# Patient Record
Sex: MALE | Race: WHITE | NOT HISPANIC OR LATINO | Employment: OTHER | URBAN - NONMETROPOLITAN AREA
[De-identification: names, ages, dates, MRNs, and addresses within clinical notes are randomized per-mention and may not be internally consistent; named-entity substitution may affect disease eponyms.]

---

## 2017-01-04 ENCOUNTER — OFFICE VISIT (OUTPATIENT)
Dept: CARDIOLOGY | Facility: CLINIC | Age: 82
End: 2017-01-04

## 2017-01-04 DIAGNOSIS — I42.9 CARDIOMYOPATHY (HCC): Primary | ICD-10-CM

## 2017-01-04 DIAGNOSIS — I48.20 CHRONIC ATRIAL FIBRILLATION (HCC): ICD-10-CM

## 2017-01-04 PROCEDURE — 93282 PRGRMG EVAL IMPLANTABLE DFB: CPT | Performed by: INTERNAL MEDICINE

## 2017-01-04 NOTE — MR AVS SNAPSHOT
Kyung Graves   1/4/2017 11:15 AM   Appointment    Dept Phone:  [phone]   Encounter #:  [id number]    Provider:  [facility]   Department:  [facility] CARDIOLOGY                Your Full Care Plan              Your Updated Medication List          This list is accurate as of: 1/4/17 11:48 AM.  Always use your most recent med list.                allopurinol 300 MG tablet   Commonly known as:  ZYLOPRIM       carvedilol 6.25 MG tablet   Commonly known as:  COREG       cholecalciferol 1000 UNITS tablet   Commonly known as:  VITAMIN D3       dicyclomine 20 MG tablet   Commonly known as:  BENTYL   TAKE ONE TABLET BY MOUTH TWICE DAILY       levothyroxine 100 MCG tablet   Commonly known as:  SYNTHROID, LEVOTHROID   TAKE ONE TABLET BY MOUTH ONCE DAILY       omeprazole 20 MG capsule   Commonly known as:  priLOSEC       warfarin 5 MG tablet   Commonly known as:  COUMADIN               Instructions     None    Patient Instructions History      Upcoming Appointments     Visit Type Date Time Department    [facility] 1/4/2017 11:15 AM [facility]    FOLLOW UP 2/23/2017  9:00 AM [facility]      MyChart Signup     Our records indicate that your [facility] [employer] account has been deactivated. If you would like to reactivate your account, please email [email or web address] or call [phone] to talk to our [employer] staff.             Other Info from Your Visit           Your Appointments     Feb 23, 2017  9:00 AM EST   Follow Up with PAULO Fry   [facility] CARDIOLOGY (--)    [place] [id number] [phone]           Arrive 15 minutes prior to appointment.              Allergies     No Known Allergies      Vital Signs     Smoking Status                   Former Smoker

## 2017-02-23 ENCOUNTER — OFFICE VISIT (OUTPATIENT)
Dept: CARDIOLOGY | Facility: CLINIC | Age: 82
End: 2017-02-23

## 2017-02-23 VITALS
DIASTOLIC BLOOD PRESSURE: 82 MMHG | HEIGHT: 74 IN | SYSTOLIC BLOOD PRESSURE: 120 MMHG | WEIGHT: 274 LBS | BODY MASS INDEX: 35.16 KG/M2

## 2017-02-23 DIAGNOSIS — Z95.810 S/P ICD (INTERNAL CARDIAC DEFIBRILLATOR) PROCEDURE: ICD-10-CM

## 2017-02-23 DIAGNOSIS — I10 ESSENTIAL HYPERTENSION: ICD-10-CM

## 2017-02-23 DIAGNOSIS — E03.8 OTHER SPECIFIED HYPOTHYROIDISM: ICD-10-CM

## 2017-02-23 DIAGNOSIS — I48.0 PAF (PAROXYSMAL ATRIAL FIBRILLATION) (HCC): ICD-10-CM

## 2017-02-23 DIAGNOSIS — I42.9 CARDIOMYOPATHY (HCC): Primary | ICD-10-CM

## 2017-02-23 PROBLEM — E03.9 HYPOTHYROID: Status: ACTIVE | Noted: 2017-02-23

## 2017-02-23 PROCEDURE — 93000 ELECTROCARDIOGRAM COMPLETE: CPT | Performed by: NURSE PRACTITIONER

## 2017-02-23 PROCEDURE — 99213 OFFICE O/P EST LOW 20 MIN: CPT | Performed by: NURSE PRACTITIONER

## 2017-02-23 RX ORDER — WARFARIN SODIUM 7.5 MG/1
7.5 TABLET ORAL
COMMUNITY
End: 2021-05-19 | Stop reason: ALTCHOICE

## 2017-02-23 RX ORDER — CARVEDILOL 3.12 MG/1
3.12 TABLET ORAL 2 TIMES DAILY WITH MEALS
COMMUNITY
End: 2023-01-04 | Stop reason: SDUPTHER

## 2017-02-23 NOTE — PROGRESS NOTES
Chief Complaint   Patient presents with   • Follow-up     Denies chest pain or palpitations. PCP refills meds. Labs per PCP about 3 weeks ago.    • Device Check     [facility] ICD check on 01/04/17. States that he was supposed to have been made an appointment for generator changeout but never heard anything.    • Shortness of Breath     About the same as before.        Cardiac Complaints  dyspnea      Subjective   Kyung Graves is a 84 y.o. male with a history of non-ischemic cardiomyopathy diagnosed in the past for which he had an ICD placed. In 2015, he underwent a cardiac catheterization that showed normal coronaries. [employer] ICD interrogation in January showed decreased battery life and about 3% ventricular pacing.Today he returns for a follow up appointment and denies chest pain and palpitations.  He admits to some shortness of breath which is the same as prior.  PCP refills all meds and manages blood work.  He is no longer following with nephrology and reports his blood pressure has been well controlled.    Cardiac History  Past Surgical History   Procedure Laterality Date   • Prostate surgery       Laser surgery 15 Years ago   • Cath lab procedure  07/21/2005     Cath-() Normal Coronaries. Normal EF   • Cardiovascular stress test  01/14/2008     P.Myoview-() EF 34% Lateral Infarct   • Pacemaker implantation  05/04/2010     Implantable defibrillator /ICD-() [employer]   • Echo - converted  06/14/2011     Echo-() EF35%. Inferior WMA.   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 RVSP-42mmHg.   • Cardiovascular stress test  12/20/2013     L.Myoview-mild ischemia, Imdur, cath if symptoms persist   • Cath lab procedure  02/13/2015     Cath-normal coronaries       Current Outpatient Prescriptions   Medication Sig Dispense Refill    • allopurinol (ZYLOPRIM) 300 MG tablet Take 300 mg by mouth daily.     • carvedilol (COREG) 3.125 MG tablet Take 3.125 mg by mouth 2 (Two) Times a Day With Meals.     • cholecalciferol (VITAMIN D3) 1000 UNITS tablet Take 1,000 Units by mouth daily.     • dicyclomine (BENTYL) 20 MG tablet TAKE ONE TABLET BY MOUTH TWICE DAILY (Patient taking differently: TAKE ONE TABLET BY MOUTH TWICE DAILY prn) 180 tablet 0   • levothyroxine (SYNTHROID, LEVOTHROID) 100 MCG tablet TAKE ONE TABLET BY MOUTH ONCE DAILY 90 tablet 0   • warfarin (COUMADIN) 5 MG tablet Take 5 mg by mouth daily. PCP moniters     • warfarin (COUMADIN) 7.5 MG tablet Take 7.5 mg by mouth. As directed per PCP       No current facility-administered medications for this visit.        Review of patient's allergies indicates no known allergies.    Past Medical History   Diagnosis Date   • A-fib      Hx   • Aortic valve regurgitation      Hx   • Diabetes mellitus    • Ejection fraction < 50%      Low   • History of prostate surgery      Laser surgery on prostate > 15yrs ago   • Hypertension    • ICD (implantable cardioverter-defibrillator) in place      ICD- placement ([facility]- 5/4/10)   • Sleep apnea      CPAP       Social History     Social History   • Marital status:      Spouse name: N/A   • Number of children: N/A   • Years of education: N/A     Occupational History   • Not on file.     Social History Main Topics   • Smoking status: Former Smoker   • Smokeless tobacco: Never Used   • Alcohol use No   • Drug use: No   • Sexual activity: Not on file     Other Topics Concern   • Not on file     Social History Narrative       Family History   Problem Relation Age of Onset   • No Known Problems Mother    • No Known Problems Father    • Cancer Other        Review of Systems   Constitutional: Negative.    HENT: Negative.    Respiratory: Positive for shortness of breath.    Cardiovascular: Negative.    Endocrine: Negative.    Musculoskeletal: Negative.   "  Neurological: Negative.    Psychiatric/Behavioral: Negative.        DiabetesNo  Thyroidnormal    Objective     Visit Vitals   • /82   • Ht 74\" (188 cm)   • Wt 274 lb (124 kg)   • BMI 35.18 kg/m2       Physical Exam   Constitutional: He is oriented to person, place, and time. He appears well-developed and well-nourished.   HENT:   Head: Normocephalic and atraumatic.   Eyes: EOM are normal. Pupils are equal, round, and reactive to light.   Neck: Normal range of motion.   Cardiovascular: Normal rate and regular rhythm.    Murmur heard.  Pulmonary/Chest: Effort normal and breath sounds normal.   Abdominal: Soft.   Musculoskeletal: Normal range of motion.   Neurological: He is alert and oriented to person, place, and time.   Skin: Skin is warm and dry.   Psychiatric: He has a normal mood and affect.         ECG 12 Lead  Date/Time: 2/23/2017 9:10 AM  Performed by: DANIEL BLANDON  Authorized by: DANIEL BLANDON   Rhythm: sinus rhythm  BPM: 71  Clinical impression: abnormal ECG  Comments: AV sensing          Assessment/Plan     HR and BP are stable today.  EKG done today for ICD and history of PAF shows NSR with first degree block with normal QT.  No changes will be made to medication.  Labs and refills he reports with you.  Could we get next copy?  No new cardiac testing at this time as no new concerns are voiced.  [employer] pacer check advised for ASAP as last pacer check in January showed about 6 months of battery life remaining.  6 month follow up advised or sooner if needed.      Problems Addressed this Visit        Cardiovascular and Mediastinum    Cardiomyopathy - Primary    Relevant Medications    carvedilol (COREG) 3.125 MG tablet    Other Relevant Orders    ECG 12 Lead (Completed)    HTN (hypertension)    Relevant Medications    carvedilol (COREG) 3.125 MG tablet    PAF (paroxysmal atrial fibrillation)    Relevant Medications    carvedilol (COREG) 3.125 MG tablet    Other Relevant Orders    ECG 12 Lead " (Completed)       Endocrine    Hypothyroid    Relevant Medications    carvedilol (COREG) 3.125 MG tablet       Other    S/P ICD (internal cardiac defibrillator) procedure              Electronically signed by PAULO Davis February 23, 2017 9:33 AM

## 2017-03-01 ENCOUNTER — OFFICE VISIT (OUTPATIENT)
Dept: CARDIOLOGY | Facility: CLINIC | Age: 82
End: 2017-03-01

## 2017-03-01 DIAGNOSIS — I48.0 PAF (PAROXYSMAL ATRIAL FIBRILLATION) (HCC): ICD-10-CM

## 2017-03-01 DIAGNOSIS — I42.9 CARDIOMYOPATHY (HCC): Primary | ICD-10-CM

## 2017-03-01 PROCEDURE — 93289 INTERROG DEVICE EVAL HEART: CPT | Performed by: INTERNAL MEDICINE

## 2017-06-21 ENCOUNTER — OFFICE VISIT (OUTPATIENT)
Dept: CARDIOLOGY | Facility: CLINIC | Age: 82
End: 2017-06-21

## 2017-06-21 DIAGNOSIS — I42.9 CARDIOMYOPATHY (HCC): Primary | ICD-10-CM

## 2017-06-21 DIAGNOSIS — I48.0 PAF (PAROXYSMAL ATRIAL FIBRILLATION) (HCC): ICD-10-CM

## 2017-06-21 PROCEDURE — 93289 INTERROG DEVICE EVAL HEART: CPT | Performed by: INTERNAL MEDICINE

## 2017-08-28 ENCOUNTER — OFFICE VISIT (OUTPATIENT)
Dept: CARDIOLOGY | Facility: CLINIC | Age: 82
End: 2017-08-28

## 2017-08-28 VITALS
WEIGHT: 268 LBS | HEIGHT: 74 IN | SYSTOLIC BLOOD PRESSURE: 108 MMHG | BODY MASS INDEX: 34.39 KG/M2 | DIASTOLIC BLOOD PRESSURE: 70 MMHG | HEART RATE: 71 BPM

## 2017-08-28 DIAGNOSIS — I42.9 CARDIOMYOPATHY (HCC): ICD-10-CM

## 2017-08-28 DIAGNOSIS — I44.0 FIRST DEGREE AV BLOCK: ICD-10-CM

## 2017-08-28 DIAGNOSIS — Z95.810 S/P ICD (INTERNAL CARDIAC DEFIBRILLATOR) PROCEDURE: ICD-10-CM

## 2017-08-28 DIAGNOSIS — I10 ESSENTIAL HYPERTENSION: ICD-10-CM

## 2017-08-28 DIAGNOSIS — I48.0 PAF (PAROXYSMAL ATRIAL FIBRILLATION) (HCC): Primary | ICD-10-CM

## 2017-08-28 DIAGNOSIS — Z79.01 CURRENT USE OF LONG TERM ANTICOAGULATION: ICD-10-CM

## 2017-08-28 PROCEDURE — 99214 OFFICE O/P EST MOD 30 MIN: CPT | Performed by: NURSE PRACTITIONER

## 2017-08-28 PROCEDURE — 93000 ELECTROCARDIOGRAM COMPLETE: CPT | Performed by: NURSE PRACTITIONER

## 2017-08-28 NOTE — PROGRESS NOTES
Chief Complaint   Patient presents with   • Follow-up     PCP refills meds. Labs per PCP about 2-3 months ago.    • Device check     [facility] ICD last checked on 06/21/17.    • Chest Pain     Has had a few sharp chest pains.    • Palpitations     Had an episode last week in which his heart was fluttering. Says he sat down and rested and it resolved.        Subjective       Kyung Graves is a 85 y.o. male  with a history of non-ischemic cardiomyopathy diagnosed in the past for which he had an ICD placed. In 2015, he underwent a cardiac catheterization that showed normal coronaries. [employer] ICD interrogation in June showed 6.8 months battery life and normal function.   Today he comes to the office for a follow up appointment. He admits to an episode of feeling his heart race for about 10 minutes that resolved with rest. He does not feel he has had any significant chest pain, rarely feels a sharp pain across his chest. No shortness of breath noted.     HPI         Cardiac History:    Past Surgical History:   Procedure Laterality Date   • CARDIOVASCULAR STRESS TEST  01/14/2008    P.[place]-() EF 34% Lateral Infarct   • CARDIOVASCULAR STRESS TEST  12/20/2013    L.Myoview-mild ischemia, Imdur, cath if symptoms persist   • CAROTID ARTERY - SUBCLAVIAN ARTERY BYPASS GRAFT  06/14/2011    Carotid US-Normal   • CATH LAB PROCEDURE  07/21/2005    Cath-() Normal Coronaries. Normal EF   • CATH LAB PROCEDURE  02/13/2015    Cath-normal coronaries   • ECHO - CONVERTED  06/14/2011    Echo-() EF35%. Inferior WMA.   • ECHO - CONVERTED  02/01/2012    Echo-EF 50-55%, aortic root dilation   • ECHO - CONVERTED  12/03/2013    Echo-EF 50%. RVSP-42mmHg.   • PACEMAKER IMPLANTATION  05/04/2010    Implantable defibrillator /ICD-() [employer]   • PROSTATE SURGERY      Laser surgery 15 Years ago       Current Outpatient Prescriptions   Medication Sig Dispense Refill   • allopurinol (ZYLOPRIM) 300 MG tablet Take 300 mg by  mouth daily.     • carvedilol (COREG) 3.125 MG tablet Take 3.125 mg by mouth 2 (Two) Times a Day With Meals.     • levothyroxine (SYNTHROID, LEVOTHROID) 100 MCG tablet TAKE ONE TABLET BY MOUTH ONCE DAILY 90 tablet 0   • warfarin (COUMADIN) 5 MG tablet Take 5 mg by mouth daily. PCP moniters     • warfarin (COUMADIN) 7.5 MG tablet Take 7.5 mg by mouth. As directed per PCP       No current facility-administered medications for this visit.        Review of patient's allergies indicates no known allergies.    Past Medical History:   Diagnosis Date   • A-fib     Hx   • Aortic valve regurgitation     Hx   • Diabetes mellitus    • Ejection fraction < 50%     Low   • History of prostate surgery     Laser surgery on prostate > 15yrs ago   • Hypertension    • ICD (implantable cardioverter-defibrillator) in place     ICD- placement ([facility]- 5/4/10)   • Sleep apnea     CPAP       Social History     Social History   • Marital status:      Spouse name: N/A   • Number of children: N/A   • Years of education: N/A     Occupational History   • Not on file.     Social History Main Topics   • Smoking status: Former Smoker   • Smokeless tobacco: Never Used   • Alcohol use No   • Drug use: No   • Sexual activity: Not on file     Other Topics Concern   • Not on file     Social History Narrative       Family History   Problem Relation Age of Onset   • No Known Problems Mother    • No Known Problems Father    • Cancer Other        Review of Systems   Constitution: Negative for decreased appetite and malaise/fatigue.   HENT: Negative for congestion, nosebleeds and sore throat.    Eyes: Negative for blurred vision.   Respiratory: Negative for shortness of breath, snoring, sputum production and wheezing. Cough: occasional.    Endocrine: Positive for cold intolerance. Negative for heat intolerance, polydipsia, polyphagia and polyuria.   Hematologic/Lymphatic: Negative for adenopathy. Does not bruise/bleed easily.   Skin: Negative for  "itching, nail changes and skin cancer.   Musculoskeletal: Negative for arthritis, falls (ambulates with use of cane) and myalgias.   Gastrointestinal: Negative for abdominal pain, dysphagia, heartburn, melena and nausea.   Genitourinary: Negative for bladder incontinence, frequency and hematuria.   Neurological: Negative for dizziness, light-headedness, seizures and vertigo.   Psychiatric/Behavioral: Negative for altered mental status.   Allergic/Immunologic: Negative for environmental allergies and hives.        Diabetes- Yes  Thyroid-normal    Objective     /70  Pulse 71  Ht 74\" (188 cm)  Wt 268 lb (122 kg)  BMI 34.41 kg/m2    Physical Exam   Constitutional: He is oriented to person, place, and time. He appears well-nourished.   HENT:   Head: Normocephalic.   Eyes: Conjunctivae are normal. Pupils are equal, round, and reactive to light.   Neck: Normal range of motion. Neck supple. No JVD present.   Cardiovascular: Normal rate, regular rhythm, S1 normal and S2 normal.    Murmur heard.   Systolic murmur is present with a grade of 2/6   Pulses:       Radial pulses are 2+ on the right side, and 2+ on the left side.   Pulmonary/Chest: Effort normal and breath sounds normal. He has no wheezes. He has no rales.   Abdominal: Soft. Bowel sounds are normal. He exhibits no distension. There is no tenderness.   Musculoskeletal: Normal range of motion. He exhibits no edema.   Neurological: He is alert and oriented to person, place, and time.   Skin: Skin is warm and dry. No rash noted. No pallor.   Psychiatric: He has a normal mood and affect. His behavior is normal.            ECG 12 Lead  Date/Time: 8/28/2017 9:03 AM  Performed by: DIONNA BOYCE  Authorized by: DIONNA BOYCE   Comparison: compared with previous ECG from 2/23/2017  Comparison to previous ECG: Sinus with first degree AV block  Rhythm: sinus rhythm  Rate: normal  BPM: 71  Conduction: 1st degree                  Assessment/Plan      Kyung was seen " today for follow-up, device check, chest pain and palpitations.    Diagnoses and all orders for this visit:    PAF (paroxysmal atrial fibrillation)  -     ECG 12 Lead    Cardiomyopathy    Essential hypertension    Current use of long term anticoagulation    S/P ICD (internal cardiac defibrillator) procedure    First degree AV block  -     ECG 12 Lead        Thank you for sending a copy of March lab report which showed normal LFT and renal function. Glucose managed by you. I encouraged him on good diabetic diet.   His vital signs today are stable. We reviewed his most recent ICD check which shows diminishing battery life. An interrogation scheduled for September. Due to ICD and PAF an EKG done today that shows sinus with first degree AV block similar to last EKG. No changes to cardiac medication or cardiac testing recommended at this time. Should symptoms or problems develop he understands to call.            Electronically signed by PAULO Carroll,  August 28, 2017 9:20 AM

## 2017-09-06 ENCOUNTER — OFFICE VISIT (OUTPATIENT)
Dept: CARDIOLOGY | Facility: CLINIC | Age: 82
End: 2017-09-06

## 2017-09-06 DIAGNOSIS — I49.5 SSS (SICK SINUS SYNDROME) (HCC): ICD-10-CM

## 2017-09-06 DIAGNOSIS — I48.0 PAF (PAROXYSMAL ATRIAL FIBRILLATION) (HCC): Primary | ICD-10-CM

## 2017-09-12 PROBLEM — I49.5 SSS (SICK SINUS SYNDROME): Status: ACTIVE | Noted: 2017-09-12

## 2018-03-01 ENCOUNTER — OFFICE VISIT (OUTPATIENT)
Dept: CARDIOLOGY | Facility: CLINIC | Age: 83
End: 2018-03-01

## 2018-03-01 VITALS
SYSTOLIC BLOOD PRESSURE: 120 MMHG | HEART RATE: 76 BPM | HEIGHT: 74 IN | DIASTOLIC BLOOD PRESSURE: 74 MMHG | WEIGHT: 273 LBS | BODY MASS INDEX: 35.04 KG/M2

## 2018-03-01 DIAGNOSIS — Z95.810 S/P ICD (INTERNAL CARDIAC DEFIBRILLATOR) PROCEDURE: ICD-10-CM

## 2018-03-01 DIAGNOSIS — I10 ESSENTIAL HYPERTENSION: ICD-10-CM

## 2018-03-01 DIAGNOSIS — I49.5 SSS (SICK SINUS SYNDROME) (HCC): ICD-10-CM

## 2018-03-01 DIAGNOSIS — I48.0 PAF (PAROXYSMAL ATRIAL FIBRILLATION) (HCC): ICD-10-CM

## 2018-03-01 DIAGNOSIS — I42.8 OTHER CARDIOMYOPATHY (HCC): Primary | ICD-10-CM

## 2018-03-01 PROCEDURE — 93000 ELECTROCARDIOGRAM COMPLETE: CPT | Performed by: NURSE PRACTITIONER

## 2018-03-01 PROCEDURE — 99213 OFFICE O/P EST LOW 20 MIN: CPT | Performed by: NURSE PRACTITIONER

## 2018-03-01 NOTE — PROGRESS NOTES
"Chief Complaint   Patient presents with   • Follow-up     For cardiac management. He states about a month ago he had some teeth extracted, had stopped Coumadin for 3 days prior to procedure. PCP manages INR and labs. PCP refills medication.   • Device check     Last [facility] ICD checked 9/6/17.   • Chest Pain     He states having some sharp pains to left chest in the evening.   • Dizziness     He states has always had, nothing new for him.       Subjective       Kyung Graves is a 85 y.o. male with a history of PAF and non-ischemic cardiomyopathy with EF 35% diagnosed in the past for which he had an ICD placed.  He is anticoagulated with warfarin managed by PCP.  Cardiac cath repeated in 2015 showed normal coronaries.  Last [facility] ICD interrogation on 9/6/17 showed his battery life is nearing BUSTER with 6.8 months battery life remaining, 3% RVP and no atrial pacing.  He came in today for his follow up visit overall feeling well.  He does report an occasional sharp chest pain for the last one month.  The pain \"shoots through\" the left side of his chest and lasts a few seconds.  It is not exertional or predictable but usually occurs in the evening after meal.  He does feel short of breath at times, worse with exertion.  Labs managed by primary care with most recent 3/2017 showing normal CBC, glucose 164, A1C 7.4%, BUN/Cr 16/1.2, cholesterol 100, Tri 66, HDL 75, LDL 11.  He is not taking any statin.  He remains active and performs ADLs independently.  No refills needed.      HPI         Cardiac History:    Past Surgical History:   Procedure Laterality Date   • CARDIAC CATHETERIZATION  07/21/2005    Cath-() Normal Coronaries. Normal EF   • CARDIAC CATHETERIZATION  02/13/2015    Cath-normal coronaries   • CARDIOVASCULAR STRESS TEST  01/14/2008    [place]-() EF 34% Lateral Infarct   • CARDIOVASCULAR STRESS TEST  12/20/2013    L.Myoview-mild ischemia, Imdur, cath if symptoms persist   • ECHO - CONVERTED  " 06/14/2011    Echo-() EF35%. Inferior WMA.   • ECHO - CONVERTED  02/01/2012    Echo-EF 50-55%, aortic root dilation   • ECHO - CONVERTED  12/03/2013    Echo-EF 50%. RVSP-42mmHg.   • PACEMAKER IMPLANTATION  05/04/2010    Implantable defibrillator /ICD-() [employer]   • US CAROTID UNILATERAL  06/14/2011    Carotid US-Normal       Current Outpatient Prescriptions   Medication Sig Dispense Refill   • allopurinol (ZYLOPRIM) 300 MG tablet Take 300 mg by mouth daily.     • carvedilol (COREG) 3.125 MG tablet Take 3.125 mg by mouth 2 (Two) Times a Day With Meals.     • levothyroxine (SYNTHROID, LEVOTHROID) 100 MCG tablet TAKE ONE TABLET BY MOUTH ONCE DAILY 90 tablet 0   • warfarin (COUMADIN) 5 MG tablet Take 5 mg by mouth daily. PCP moniters     • warfarin (COUMADIN) 7.5 MG tablet Take 7.5 mg by mouth. As directed per PCP       No current facility-administered medications for this visit.        Review of patient's allergies indicates no known allergies.    Past Medical History:   Diagnosis Date   • A-fib     Hx   • Aortic valve regurgitation     Hx   • Diabetes mellitus    • Ejection fraction < 50%     Low   • History of prostate surgery     Laser surgery on prostate > 15yrs ago   • Hypertension    • ICD (implantable cardioverter-defibrillator) in place     ICD- placement ([facility]- 5/4/10)   • Sleep apnea     CPAP       Social History     Social History   • Marital status:      Spouse name: N/A   • Number of children: N/A   • Years of education: N/A     Occupational History   • Not on file.     Social History Main Topics   • Smoking status: Former Smoker   • Smokeless tobacco: Never Used   • Alcohol use No   • Drug use: No   • Sexual activity: Not on file     Other Topics Concern   • Not on file     Social History Narrative       Family History   Problem Relation Age of Onset   • No Known Problems Mother    • No Known Problems Father    • Cancer Other        Review of Systems   Constitution: Negative for  "weakness and malaise/fatigue.   HENT: Negative.    Eyes: Negative.    Cardiovascular: Positive for chest pain.   Respiratory: Positive for shortness of breath. Negative for cough.    Endocrine: Negative.    Hematologic/Lymphatic: Negative.    Skin: Negative.    Musculoskeletal: Negative for back pain and myalgias.   Gastrointestinal: Positive for heartburn. Negative for abdominal pain and melena.   Genitourinary: Negative for dysuria and hematuria.   Neurological: Negative for dizziness.   Psychiatric/Behavioral: Negative for altered mental status.   Allergic/Immunologic: Negative.         Diabetes- Yes, A1C 7.4%  Thyroid-normal    Objective     /74 (BP Location: Left arm)  Pulse 76  Ht 188 cm (74.02\")  Wt 124 kg (273 lb)  BMI 35.04 kg/m2    Physical Exam   Constitutional: He is oriented to person, place, and time. He appears well-developed and well-nourished.   HENT:   Head: Normocephalic.   Eyes: Pupils are equal, round, and reactive to light.   Neck: Normal range of motion.   Cardiovascular: Normal rate, regular rhythm and intact distal pulses.    Murmur heard.  Pulmonary/Chest: Effort normal and breath sounds normal. No respiratory distress. He has no wheezes. He has no rales.   Abdominal: Soft. Bowel sounds are normal.   Musculoskeletal: Normal range of motion. He exhibits edema (trace).   Neurological: He is alert and oriented to person, place, and time.   Skin: Skin is warm and dry. No pallor.   Psychiatric: He has a normal mood and affect.   Nursing note and vitals reviewed.       ECG 12 Lead  Date/Time: 3/2/2018 11:36 AM  Performed by: LOTUS FOX  Authorized by: LOTUS FOX   Comparison: compared with previous ECG from 8/28/2017  Similar to previous ECG  Comparison to previous ECG: Sinus with 1st degree block   Rhythm: sinus rhythm  Rate: normal  BPM: 76  Clinical impression: non-specific ECG  Comments: QTc 436 ms                  Assessment/Plan    Heart rate and blood pressure stable.  EKG " done today showed sinus with arrhythmia, QTc 436 ms.  He will be scheduled for [facility] pacer check to evaluate battery status as he is nearing BUSTER.  When he reaches BUSTER, he will be referred to Dr. Willson for generator change.  This was explained to him.  Labs have been managed by primary care.  Cholesterol is extremely low with LDL 11, not on any cholesterol lowering medication.  We discussed his chest pain which appears to be more GI related.  We discussed repeating cardiac work up which he has declined at this time.  He did agree to undergo echocardiogram to evaluate LV function, valvular structures, rule out any pericardial effusion, and look for wall motion abnormality.  This will be coordinated with his pacer check.  He will follow with you for labs.  No refills needed today.  Recommendations to follow ICD interrogation and echo.  We will see him back in six months or sooner if needed.       Kyung was seen today for follow-up, device check, chest pain and dizziness.    Diagnoses and all orders for this visit:    Other cardiomyopathy  -     Adult Transthoracic Echo Complete W/ Cont if Necessary Per Protocol; Future    Essential hypertension  -     Adult Transthoracic Echo Complete W/ Cont if Necessary Per Protocol; Future    PAF (paroxysmal atrial fibrillation)  -     Adult Transthoracic Echo Complete W/ Cont if Necessary Per Protocol; Future    SSS (sick sinus syndrome)  -     Adult Transthoracic Echo Complete W/ Cont if Necessary Per Protocol; Future    S/P ICD (internal cardiac defibrillator) procedure  -     Adult Transthoracic Echo Complete W/ Cont if Necessary Per Protocol; Future    Other orders  -     ECG 12 Lead                    Electronically signed by PAULO Hansen,  March 2, 2018 12:05 PM

## 2018-03-21 ENCOUNTER — OUTSIDE FACILITY SERVICE (OUTPATIENT)
Dept: CARDIOLOGY | Facility: CLINIC | Age: 83
End: 2018-03-21

## 2018-03-21 ENCOUNTER — HOSPITAL ENCOUNTER (OUTPATIENT)
Dept: CARDIOLOGY | Facility: HOSPITAL | Age: 83
Discharge: HOME | End: 2018-03-21
Admitting: NURSE PRACTITIONER

## 2018-03-21 ENCOUNTER — OFFICE VISIT (OUTPATIENT)
Dept: CARDIOLOGY | Facility: CLINIC | Age: 83
End: 2018-03-21

## 2018-03-21 DIAGNOSIS — I10 ESSENTIAL HYPERTENSION: ICD-10-CM

## 2018-03-21 DIAGNOSIS — I48.0 PAF (PAROXYSMAL ATRIAL FIBRILLATION) (HCC): ICD-10-CM

## 2018-03-21 DIAGNOSIS — Z95.810 S/P ICD (INTERNAL CARDIAC DEFIBRILLATOR) PROCEDURE: ICD-10-CM

## 2018-03-21 DIAGNOSIS — I49.5 SSS (SICK SINUS SYNDROME) (HCC): ICD-10-CM

## 2018-03-21 DIAGNOSIS — I48.0 PAF (PAROXYSMAL ATRIAL FIBRILLATION) (HCC): Primary | ICD-10-CM

## 2018-03-21 DIAGNOSIS — I42.8 OTHER CARDIOMYOPATHY (HCC): ICD-10-CM

## 2018-03-21 LAB
MAXIMAL PREDICTED HEART RATE: 135 BPM
STRESS TARGET HR: 115 BPM

## 2018-03-21 PROCEDURE — 93289 INTERROG DEVICE EVAL HEART: CPT | Performed by: INTERNAL MEDICINE

## 2018-03-21 PROCEDURE — 93306 TTE W/DOPPLER COMPLETE: CPT | Performed by: INTERNAL MEDICINE

## 2018-03-21 PROCEDURE — 93306 TTE W/DOPPLER COMPLETE: CPT

## 2018-06-06 ENCOUNTER — OFFICE VISIT (OUTPATIENT)
Dept: CARDIOLOGY | Facility: CLINIC | Age: 83
End: 2018-06-06

## 2018-06-06 DIAGNOSIS — I48.0 PAF (PAROXYSMAL ATRIAL FIBRILLATION) (HCC): Primary | ICD-10-CM

## 2018-06-06 DIAGNOSIS — I49.5 SSS (SICK SINUS SYNDROME) (HCC): ICD-10-CM

## 2018-06-11 ENCOUNTER — TELEPHONE (OUTPATIENT)
Dept: CARDIOLOGY | Facility: CLINIC | Age: 83
End: 2018-06-11

## 2018-06-11 DIAGNOSIS — I49.5 SSS (SICK SINUS SYNDROME) (HCC): Primary | ICD-10-CM

## 2018-06-11 NOTE — TELEPHONE ENCOUNTER
Spoke with daughter regarding referral to Dr Willson for [employer] ICD generator change.  Wanted it after July 4th, scheduled for July 10th at 2:30. Daughter aware.     Please start referral.

## 2018-09-04 ENCOUNTER — OFFICE VISIT (OUTPATIENT)
Dept: CARDIOLOGY | Facility: CLINIC | Age: 83
End: 2018-09-04

## 2018-09-04 VITALS
WEIGHT: 263 LBS | HEART RATE: 88 BPM | SYSTOLIC BLOOD PRESSURE: 118 MMHG | DIASTOLIC BLOOD PRESSURE: 82 MMHG | HEIGHT: 74 IN | BODY MASS INDEX: 33.75 KG/M2

## 2018-09-04 DIAGNOSIS — Z95.810 S/P ICD (INTERNAL CARDIAC DEFIBRILLATOR) PROCEDURE: ICD-10-CM

## 2018-09-04 DIAGNOSIS — I10 ESSENTIAL HYPERTENSION: ICD-10-CM

## 2018-09-04 DIAGNOSIS — I48.0 PAF (PAROXYSMAL ATRIAL FIBRILLATION) (HCC): ICD-10-CM

## 2018-09-04 DIAGNOSIS — I49.5 SSS (SICK SINUS SYNDROME) (HCC): ICD-10-CM

## 2018-09-04 DIAGNOSIS — I42.8 OTHER CARDIOMYOPATHY (HCC): Primary | ICD-10-CM

## 2018-09-04 DIAGNOSIS — E03.8 OTHER SPECIFIED HYPOTHYROIDISM: ICD-10-CM

## 2018-09-04 PROCEDURE — 93000 ELECTROCARDIOGRAM COMPLETE: CPT | Performed by: NURSE PRACTITIONER

## 2018-09-04 PROCEDURE — 99213 OFFICE O/P EST LOW 20 MIN: CPT | Performed by: NURSE PRACTITIONER

## 2018-09-04 NOTE — PROGRESS NOTES
Chief Complaint   Patient presents with   • Follow-up     for cardiac management   • Med Refill     PCP writes refills and manages labs   • [employer] [facility] ICD     last checked 6/6/18, to see Dr Willson Sept 18 to schedule battery change   • Aspirin     pt does not take a daily aspirin, currently on Coumadin       Subjective       Kyung Graves is an 86 y.o. male  with a history of PAF and non-ischemic cardiomyopathy with EF 35% diagnosed in the past for which he had an ICD placed. He is anticoagulated with warfarin managed by PCP.  Cardiac cath repeated in 2015 showed normal coronaries. Last SJM ICD interrogation on 6/6/18 showed 2.9 months remaining, and he was referred to Dr. Willson for generator change. Echocardiogram on 3/21/18 showed questionable bicuspid AV, mod AI, AO root 4.9 cm.  Labs managed by primary care with most recent 3/2017 showing normal CBC, glucose 164, A1C 7.4%, BUN/Cr 16/1.2, cholesterol 100, Tri 66, HDL 75, LDL 11.  He is not taking any statin.  He came in today for follow up.  He remains asymptomatic. Denies chest pain, shortness of breath, or dizziness. He performs ADLs independently. PT/INR per Dr. Mathias has been stable. He is scheduled for generator change with Dr. Willson on 9/18/18. No refills needed.      HPI         Cardiac History:    Past Surgical History:   Procedure Laterality Date   • CARDIAC CATHETERIZATION  07/21/2005    Cath-() Normal Coronaries. Normal EF   • CARDIAC CATHETERIZATION  02/13/2015    Cath-normal coronaries   • CARDIOVASCULAR STRESS TEST  01/14/2008    Stacey-() EF 34% Lateral Infarct   • CARDIOVASCULAR STRESS TEST  12/20/2013    L.Myoview-mild ischemia, Imdur, cath if symptoms persist   • ECHO - CONVERTED  06/14/2011    Echo-() EF35%. Inferior WMA.   • ECHO - CONVERTED  02/01/2012    Echo-EF 50-55%, aortic root dilation   • ECHO - CONVERTED  12/03/2013    Echo-EF 50%. RVSP-42mmHg.   • ECHO - CONVERTED  03/21/2018    EF 55%. ?Bicuspia  AV. Mod AI. AO Root- 4.9   • PACEMAKER IMPLANTATION  05/04/2010    Implantable defibrillator /ICD-() [employer]   • US CAROTID UNILATERAL  06/14/2011    Carotid US-Normal       Current Outpatient Prescriptions   Medication Sig Dispense Refill   • allopurinol (ZYLOPRIM) 300 MG tablet Take 300 mg by mouth daily.     • carvedilol (COREG) 3.125 MG tablet Take 3.125 mg by mouth 2 (Two) Times a Day With Meals.     • levothyroxine (SYNTHROID, LEVOTHROID) 100 MCG tablet TAKE ONE TABLET BY MOUTH ONCE DAILY 90 tablet 0   • warfarin (COUMADIN) 5 MG tablet Take 5 mg by mouth daily. PCP moniters     • warfarin (COUMADIN) 7.5 MG tablet Take 7.5 mg by mouth. As directed per PCP       No current facility-administered medications for this visit.        Patient has no known allergies.    Past Medical History:   Diagnosis Date   • A-fib (CMS/HCC)     Hx   • Aortic valve regurgitation     Hx   • Diabetes mellitus (CMS/HCC)    • Ejection fraction < 50%     Low   • History of prostate surgery     Laser surgery on prostate > 15yrs ago   • Hypertension    • ICD (implantable cardioverter-defibrillator) in place     ICD- placement ([facility]- 5/4/10)   • Sleep apnea     CPAP       Social History     Social History   • Marital status:      Spouse name: N/A   • Number of children: N/A   • Years of education: N/A     Occupational History   • Not on file.     Social History Main Topics   • Smoking status: Former Smoker   • Smokeless tobacco: Never Used   • Alcohol use No   • Drug use: No   • Sexual activity: Not on file     Other Topics Concern   • Not on file     Social History Narrative   • No narrative on file       Family History   Problem Relation Age of Onset   • No Known Problems Mother    • No Known Problems Father    • Cancer Other        Review of Systems   Constitution: Positive for weight loss (down 10 lb ). Negative for decreased appetite, weakness and malaise/fatigue.   HENT: Negative.    Eyes: Negative.    Cardiovascular:  "Positive for dyspnea on exertion (only moderate to significant exertion ).   Respiratory: Negative for cough and shortness of breath.    Endocrine: Negative.    Hematologic/Lymphatic: Negative.    Skin: Negative.    Gastrointestinal: Negative for abdominal pain and melena.   Genitourinary: Negative for dysuria and hematuria.   Neurological: Negative for dizziness.   Psychiatric/Behavioral: Negative for altered mental status and depression.   Allergic/Immunologic: Negative.       Diabetes- No  Thyroid-normal    Objective     /82   Pulse 88   Ht 188 cm (74\")   Wt 119 kg (263 lb)   BMI 33.77 kg/m²     Physical Exam   Constitutional: He is oriented to person, place, and time. He appears well-developed and well-nourished.   HENT:   Head: Normocephalic and atraumatic.   Eyes: Pupils are equal, round, and reactive to light.   Neck: Normal range of motion.   Cardiovascular: Normal rate, regular rhythm and intact distal pulses.    Murmur heard.  Pulmonary/Chest: Effort normal and breath sounds normal. No respiratory distress.   Abdominal: Soft. Bowel sounds are normal. He exhibits no distension.   Musculoskeletal: Normal range of motion. He exhibits no edema.   Neurological: He is alert and oriented to person, place, and time.   Skin: Skin is warm and dry.   Psychiatric: He has a normal mood and affect.   Nursing note and vitals reviewed.       ECG 12 Lead  Date/Time: 9/4/2018 11:49 AM  Performed by: LOTUS FOX  Authorized by: LOTUS FOX   Comparison: compared with previous ECG from 3/1/2018  Similar to previous ECG  Rhythm: sinus rhythm and A-V block  Rate: normal  BPM: 88  Conduction: 1st degree  Clinical impression: non-specific ECG  Comments:  ms  QTc 462 ms                Assessment/Plan    Heart rate and blood pressure stable. EKG today for PAF and device showed sinus rhythm, first degree AV block. He is planning for ICD generator change on 9/18/18. Warfarin managed by Dr. Mathias. No s/s of bleeding. " We reviewed the findings of recent echocardiogram with moderate AS and dilated aortic root. He is advised to limit heavy lifting. Labs and refills per primary care. He was encouraged to follow heart healthy diet, low in sodium and saturated fats. We will see him back for hospital follow up once he has generator change.   Kyung was seen today for follow-up, med refill, st baljit icd and aspirin.    Diagnoses and all orders for this visit:    Other cardiomyopathy (CMS/HCC)    Essential hypertension    PAF (paroxysmal atrial fibrillation) (CMS/HCC)    SSS (sick sinus syndrome) (CMS/HCC)    S/P ICD (internal cardiac defibrillator) procedure    Other specified hypothyroidism    Other orders  -     ECG 12 Lead        Patient's Body mass index is 33.77 kg/m². BMI is above normal parameters. Recommendations include: nutrition counseling.                      Electronically signed by PAULO Hansen,  September 4, 2018 12:06 PM

## 2019-03-20 ENCOUNTER — OFFICE VISIT (OUTPATIENT)
Dept: CARDIOLOGY | Facility: CLINIC | Age: 84
End: 2019-03-20

## 2019-03-20 VITALS
DIASTOLIC BLOOD PRESSURE: 100 MMHG | HEART RATE: 72 BPM | BODY MASS INDEX: 31.71 KG/M2 | HEIGHT: 76 IN | WEIGHT: 260.38 LBS | SYSTOLIC BLOOD PRESSURE: 140 MMHG

## 2019-03-20 DIAGNOSIS — I10 ESSENTIAL HYPERTENSION: ICD-10-CM

## 2019-03-20 DIAGNOSIS — I49.3 PVC (PREMATURE VENTRICULAR CONTRACTION): ICD-10-CM

## 2019-03-20 DIAGNOSIS — R01.1 CARDIAC MURMUR: ICD-10-CM

## 2019-03-20 DIAGNOSIS — I48.0 PAF (PAROXYSMAL ATRIAL FIBRILLATION) (HCC): Primary | ICD-10-CM

## 2019-03-20 DIAGNOSIS — I49.5 SSS (SICK SINUS SYNDROME) (HCC): ICD-10-CM

## 2019-03-20 DIAGNOSIS — Z95.810 S/P ICD (INTERNAL CARDIAC DEFIBRILLATOR) PROCEDURE: ICD-10-CM

## 2019-03-20 DIAGNOSIS — I35.1 AORTIC VALVE INSUFFICIENCY, ETIOLOGY OF CARDIAC VALVE DISEASE UNSPECIFIED: ICD-10-CM

## 2019-03-20 DIAGNOSIS — Z79.01 CURRENT USE OF LONG TERM ANTICOAGULATION: ICD-10-CM

## 2019-03-20 DIAGNOSIS — I42.8 OTHER CARDIOMYOPATHY (HCC): ICD-10-CM

## 2019-03-20 PROCEDURE — 93289 INTERROG DEVICE EVAL HEART: CPT | Performed by: INTERNAL MEDICINE

## 2019-03-20 PROCEDURE — 93000 ELECTROCARDIOGRAM COMPLETE: CPT | Performed by: NURSE PRACTITIONER

## 2019-03-20 PROCEDURE — 99214 OFFICE O/P EST MOD 30 MIN: CPT | Performed by: NURSE PRACTITIONER

## 2019-03-20 NOTE — PROGRESS NOTES
Chief Complaint   Patient presents with   • Follow-up     For cardiac management. [employer] PM check today. Denies chest pain, palpitations and shortness of breath.   • Med Refill     PCP refills medications. Verbally confirmed medications       Subjective       Kyung Graves is a 86 y.o. male  with a history of PAF and non-ischemic cardiomyopathy with EF 35% diagnosed in the past for which he had an ICD placed. He is anticoagulated with warfarin managed by PCP.  Cardiac cath repeated in 2015 showed normal coronaries.  ICD interrogation on 6/6/18 showed 2.9 months remaining, and he was referred to Dr. Willson for generator change, It was not felt the battery was yet close enough to BUSTER and decided to continue to monitor. Echocardiogram on 3/21/18 showed questionable bicuspid AV, mod AI, AO root 4.9 cm.      Today he comes to the office for a follow up visit and pacemaker interrogation. BUSTER battery life noted. Mr. Subramanian denies chest pain, palpitations or dizziness. He becomes easily short of breath with exertion and fatigue, which is no worse since his last visit. No recent medication changes noted.     HPI     Cardiac History:    Past Surgical History:   Procedure Laterality Date   • CARDIAC CATHETERIZATION  07/21/2005    Cath-() Normal Coronaries. Normal EF   • CARDIAC CATHETERIZATION  02/13/2015    Cath-normal coronaries   • CARDIOVASCULAR STRESS TEST  01/14/2008    P.Myoview-() EF 34% Lateral Infarct   • CARDIOVASCULAR STRESS TEST  12/20/2013    L.Myoview-mild ischemia, Imdur, cath if symptoms persist   • ECHO - CONVERTED  06/14/2011    Echo-() EF35%. Inferior WMA.   • ECHO - CONVERTED  02/01/2012    Echo-EF 50-55%, aortic root dilation   • ECHO - CONVERTED  12/03/2013    Echo-EF 50%. RVSP-42mmHg.   • ECHO - CONVERTED  03/21/2018    EF 55%. ?Bicuspia AV. Mod AI. AO Root- 4.9   • PACEMAKER IMPLANTATION  05/04/2010    Implantable defibrillator /ICD-() [employer]   • US CAROTID  UNILATERAL  06/14/2011    Carotid US-Normal       Current Outpatient Medications   Medication Sig Dispense Refill   • allopurinol (ZYLOPRIM) 300 MG tablet Take 300 mg by mouth daily.     • carvedilol (COREG) 3.125 MG tablet Take 3.125 mg by mouth 2 (Two) Times a Day With Meals.     • levothyroxine (SYNTHROID, LEVOTHROID) 100 MCG tablet TAKE ONE TABLET BY MOUTH ONCE DAILY 90 tablet 0   • warfarin (COUMADIN) 5 MG tablet Take 5 mg by mouth daily. PCP moniters     • warfarin (COUMADIN) 7.5 MG tablet Take 7.5 mg by mouth. As directed per PCP       No current facility-administered medications for this visit.        Patient has no known allergies.    Past Medical History:   Diagnosis Date   • A-fib (CMS/HCC)     Hx   • Aortic valve regurgitation     Hx   • Diabetes mellitus (CMS/HCC)    • Ejection fraction < 50%     Low   • History of prostate surgery     Laser surgery on prostate > 15yrs ago   • Hypertension    • ICD (implantable cardioverter-defibrillator) in place     ICD- placement ([facility]- 5/4/10)   • Sleep apnea     CPAP       Social History     Socioeconomic History   • Marital status:      Spouse name: Not on file   • Number of children: Not on file   • Years of education: Not on file   • Highest education level: Not on file   Tobacco Use   • Smoking status: Former Smoker   • Smokeless tobacco: Never Used   Substance and Sexual Activity   • Alcohol use: No   • Drug use: No       Family History   Problem Relation Age of Onset   • No Known Problems Mother    • No Known Problems Father    • Cancer Other        Review of Systems   Constitution: Positive for malaise/fatigue. Negative for decreased appetite.   Eyes: Negative for visual disturbance.   Respiratory: Positive for shortness of breath. Negative for cough.    Gastrointestinal: Negative for heartburn and nausea.   Genitourinary: Negative for dysuria and hematuria.   Neurological: Positive for loss of balance (ambulates with use of cane). Negative for  "dizziness and headaches.   Psychiatric/Behavioral: Negative for memory loss. The patient is not nervous/anxious.         Objective     /100 (BP Location: Right arm)   Pulse 72   Ht 193 cm (76\")   Wt 118 kg (260 lb 6 oz)   BMI 31.69 kg/m²     Physical Exam   Constitutional: He is oriented to person, place, and time. He appears well-nourished.   HENT:   Head: Normocephalic.   Eyes: Conjunctivae are normal. Pupils are equal, round, and reactive to light.   Neck: Normal range of motion. Neck supple. Carotid bruit is not present.   Cardiovascular: Normal rate, S1 normal and S2 normal. An irregular rhythm present.   Murmur heard.  Pulses:       Radial pulses are 2+ on the right side, and 2+ on the left side.   Pulmonary/Chest: Breath sounds normal. He has no wheezes. He has no rales.   Abdominal: Soft. Bowel sounds are normal. He exhibits no distension. There is no tenderness.   Musculoskeletal: Normal range of motion. He exhibits edema (mild lower legs).   Neurological: He is alert and oriented to person, place, and time.   Skin: Skin is warm and dry. No pallor.   Psychiatric: He has a normal mood and affect. His behavior is normal.          ECG 12 Lead  Date/Time: 3/20/2019 10:44 AM  Performed by: Annita Herrera APRN  Authorized by: Annita Herrera APRN   Comparison: compared with previous ECG from 9/4/2018  Comparison to previous ECG: Sinus, first degree AV block  Rhythm: atrial fibrillation  Ectopy: unifocal PVCs  BPM: 72                  Assessment/Plan      Kyung was seen today for follow-up and med refill.    Diagnoses and all orders for this visit:    PAF (paroxysmal atrial fibrillation) (CMS/HCC)    SSS (sick sinus syndrome) (CMS/HCC)    Other cardiomyopathy (CMS/HCC)    Essential hypertension    S/P ICD (internal cardiac defibrillator) procedure    Current use of long term anticoagulation    PVC (premature ventricular contraction)    Aortic valve insufficiency, etiology of cardiac valve disease " unspecified    Cardiac murmur    Other orders  -     ECG 12 Lead    Pacemaker interrogation today shows battery at BUSTER.  He will be referred back to Dr. Willson for generator change out.    EKG for management of PAF today shows atrial fibrillation with 1 PVC.  He continues warfarin therapy without signs of bleeding.  He follows with PCP for management of PT/INR.    The report of echocardiogram was reviewed which showed moderate AI. LVEF 55%. He denies new or worsening cardiac symptoms today. No repeat cardiac testing advised at this time.     His blood pressure is slightly increased today which he contributes to having some anxiety and unsure if he had his morning medication.  He agrees to monitor and call if not at goal.  At this time advised to continue same dose of Coreg.  No refills needed.    Patient's Body mass index is 31.69 kg/m². BMI is above normal parameters. Recommendations include: nutrition counseling.  Healthy diet encouraged.      Post hospital follow-up visit will be scheduled.           Electronically signed by PAULO Carroll,  March 22, 2019 5:30 PM

## 2019-05-01 ENCOUNTER — TELEPHONE (OUTPATIENT)
Dept: CARDIOLOGY | Facility: CLINIC | Age: 84
End: 2019-05-01

## 2019-05-01 ENCOUNTER — OFFICE VISIT (OUTPATIENT)
Dept: CARDIOLOGY | Facility: CLINIC | Age: 84
End: 2019-05-01

## 2019-05-01 DIAGNOSIS — I49.5 SSS (SICK SINUS SYNDROME) (HCC): ICD-10-CM

## 2019-05-01 DIAGNOSIS — Z95.810 S/P ICD (INTERNAL CARDIAC DEFIBRILLATOR) PROCEDURE: ICD-10-CM

## 2019-05-01 DIAGNOSIS — I48.0 PAF (PAROXYSMAL ATRIAL FIBRILLATION) (HCC): Primary | ICD-10-CM

## 2019-05-01 PROCEDURE — 93282 PRGRMG EVAL IMPLANTABLE DFB: CPT | Performed by: INTERNAL MEDICINE

## 2019-05-01 NOTE — TELEPHONE ENCOUNTER
Pt's daughter, pt was here for a PPM check today. After going home he started having chest tightness, progressively getting worse. Daughter is taking him to ER for evaluation.

## 2019-05-02 ENCOUNTER — TELEPHONE (OUTPATIENT)
Dept: CARDIOLOGY | Facility: CLINIC | Age: 84
End: 2019-05-02

## 2019-05-02 NOTE — TELEPHONE ENCOUNTER
Kim, pt's daughter called.  Pt refused to go to ER last night. Said he didn't feel like he was having a heart attack, that since adjusting his PPM yesterday it just feels really weird, uncomfortable in his chest. Said it started as soon as they adjusted it. Following changes were made:    Base Rate increased to 70, sensor on    Asking if it could be changed back.

## 2019-05-03 ENCOUNTER — OFFICE VISIT (OUTPATIENT)
Dept: CARDIOLOGY | Facility: CLINIC | Age: 84
End: 2019-05-03

## 2019-05-03 DIAGNOSIS — I48.0 PAF (PAROXYSMAL ATRIAL FIBRILLATION) (HCC): Primary | ICD-10-CM

## 2019-05-03 DIAGNOSIS — I49.5 SSS (SICK SINUS SYNDROME) (HCC): ICD-10-CM

## 2019-05-03 PROCEDURE — 93282 PRGRMG EVAL IMPLANTABLE DFB: CPT | Performed by: INTERNAL MEDICINE

## 2019-09-03 NOTE — PROGRESS NOTES
"Chief Complaint   Patient presents with   • Follow-up     Cardiac and PAF management . no current labs on chart, has not had any labs done recently . PCP manages INR. Has weakness in both legs which has been worse latlv.   • Pacemaker Check     St[employer]Mundo pacemaker checked may 2019   • Med Refill     No refills needed ,PCP manages refills .Reviewed meds verbally .       Subjective       Kyung Garves is a 87 y.o. male with a history of PAF and non-ischemic cardiomyopathy with EF 35% diagnosed in the past for which he had an ICD placed. He is anticoagulated with warfarin managed by PCP.  Cardiac cath repeated in 2015 showed normal coronaries. Echocardiogram on 3/21/18 showed questionable bicuspid AV, mod AI, AO root 4.9 cm. Pacemaker interrogation 3/20/19 showed battery at BUSTER. He was referred to Dr. Willson. On 4/1/19, he underwent single chamber AICD generator replacement. Interrogation in May showed 85% ventricular pacing,  battery at > 95%, and  VVI mode with base rate 70. Rate was decreased back to 50.     Today he comes to the office for a follow-up visit.  He denies chest pain or palpitations.  He has shortness of breath with exertion but no worse than before.  He has mild swelling of his ankles and feet.  He does admit to feeling better since \"rate was lowered\".  No medication changes are noted.      HPI     Cardiac History:    Past Surgical History:   Procedure Laterality Date   • CARDIAC CATHETERIZATION  07/21/2005    Cath-() Normal Coronaries. Normal EF   • CARDIAC CATHETERIZATION  02/13/2015    Cath-normal coronaries   • CARDIOVASCULAR STRESS TEST  01/14/2008    Stacey-() EF 34% Lateral Infarct   • CARDIOVASCULAR STRESS TEST  12/20/2013    L.Myoview-mild ischemia, Imdur, cath if symptoms persist   • ECHO - CONVERTED  06/14/2011    Echo-() EF35%. Inferior WMA.   • ECHO - CONVERTED  02/01/2012    Echo-EF 50-55%, aortic root dilation   • ECHO - CONVERTED  12/03/2013    Echo-EF " 50%. RVSP-42mmHg.   • ECHO - CONVERTED  03/21/2018    EF 55%. ?Bicuspia AV. Mod AI. AO Root- 4.9   • PACEMAKER IMPLANTATION  05/04/2010    Implantable defibrillator /ICD-() [employer]   • US CAROTID UNILATERAL  06/14/2011    Carotid US-Normal       Current Outpatient Medications   Medication Sig Dispense Refill   • allopurinol (ZYLOPRIM) 300 MG tablet Take 300 mg by mouth daily.     • carvedilol (COREG) 3.125 MG tablet Take 3.125 mg by mouth 2 (Two) Times a Day With Meals.     • levothyroxine (SYNTHROID, LEVOTHROID) 100 MCG tablet TAKE ONE TABLET BY MOUTH ONCE DAILY 90 tablet 0   • warfarin (COUMADIN) 5 MG tablet Take 5 mg by mouth daily. PCP moniters     • warfarin (COUMADIN) 7.5 MG tablet Take 7.5 mg by mouth. As directed per PCP       No current facility-administered medications for this visit.        Patient has no known allergies.    Past Medical History:   Diagnosis Date   • A-fib (CMS/HCC)     Hx   • Aortic valve regurgitation     Hx   • Diabetes mellitus (CMS/HCC)    • Ejection fraction < 50%     Low   • History of prostate surgery     Laser surgery on prostate > 15yrs ago   • Hypertension    • ICD (implantable cardioverter-defibrillator) in place     ICD- placement ([facility]- 5/4/10)   • Sleep apnea     CPAP       Social History     Socioeconomic History   • Marital status:      Spouse name: Not on file   • Number of children: Not on file   • Years of education: Not on file   • Highest education level: Not on file   Tobacco Use   • Smoking status: Former Smoker   • Smokeless tobacco: Never Used   Substance and Sexual Activity   • Alcohol use: No   • Drug use: No       Family History   Problem Relation Age of Onset   • No Known Problems Mother    • No Known Problems Father    • Cancer Other        Review of Systems   Constitution: Negative for decreased appetite and weakness.   HENT: Negative for congestion and nosebleeds.    Cardiovascular: Positive for irregular heartbeat, leg swelling (mild in  "feet and ankles) and palpitations. Negative for chest pain and near-syncope.   Respiratory: Positive for shortness of breath. Negative for cough.    Endocrine: Negative for polydipsia, polyphagia and polyuria.   Skin: Negative for dry skin, flushing and itching.   Musculoskeletal: Positive for joint pain and stiffness. Negative for falls and muscle cramps.   Gastrointestinal: Negative for abdominal pain, dysphagia, melena and nausea.   Genitourinary: Negative for dysuria and hematuria.   Neurological: Positive for loss of balance (not a new problem, uses cane). Negative for dizziness and headaches.   Psychiatric/Behavioral: Negative for altered mental status and memory loss. The patient is not nervous/anxious.    Allergic/Immunologic: Negative for hives and persistent infections.        Objective     /88 (BP Location: Left arm)   Pulse 69   Ht 193 cm (76\")   Wt 117 kg (258 lb)   BMI 31.40 kg/m²     Physical Exam   Constitutional: He is oriented to person, place, and time. He appears well-nourished.   HENT:   Head: Normocephalic.   Eyes: Conjunctivae are normal. Pupils are equal, round, and reactive to light.   Neck: Normal range of motion. Neck supple. Carotid bruit is not present.   Cardiovascular: Normal rate, S1 normal and S2 normal. An irregular rhythm present.   No murmur heard.  Pulses:       Radial pulses are 2+ on the right side, and 2+ on the left side.   Pulmonary/Chest: Breath sounds normal.   Abdominal: Soft. Bowel sounds are normal.   Musculoskeletal: Normal range of motion.   Neurological: He is alert and oriented to person, place, and time.   Skin: Skin is warm.   Psychiatric: He has a normal mood and affect. His behavior is normal.          ECG 12 Lead  Date/Time: 9/4/2019 12:06 PM  Performed by: Annita Herrera APRN  Authorized by: Annita Herrera APRN   Comparison: compared with previous ECG from 3/20/2019  Comparison to previous ECG: Atrial fib, PVC noted  Rhythm: atrial " fibrillation  BPM: 69                  Assessment/Plan      Kyung was seen today for follow-up, pacemaker check and med refill.    Diagnoses and all orders for this visit:    SSS (sick sinus syndrome) (CMS/HCC)    Other cardiomyopathy (CMS/HCC)    PAF (paroxysmal atrial fibrillation) (CMS/HCC)    Essential hypertension    S/P ICD (internal cardiac defibrillator) procedure    Current use of long term anticoagulation    Other orders  -     ECG 12 Lead      SSS/PAF/ICD- EKG today shows atrial fibrillation with normal ventricular response.  Advised to continue Coreg at 3.25 mg twice daily.  His last pacemaker interrogation was reviewed and base rate noted to be 50.  A repeat pacemaker interrogation scheduled to be done at next visit in 6 months.  For stroke prevention he is on anticoagulation therapy in the form of warfare and.  He will follow with you for lab orders including management of PT/INR.    HTN-blood pressure is stable.  He monitors blood pressure at home and report remain normal with systolic most often 120s to 130s.  No medication changes advised.    Patient's Body mass index is 31.4 kg/m². BMI is above normal parameters. Recommendations include: nutrition counseling.  His weight is down 2 pounds from last visit.  He reports good appetite.  I encouraged him on heart healthy diet including low-salt.     A 6-month follow-up visit scheduled.  Please call sooner for any cardiac concerns.           Electronically signed by PAULO Carroll,  September 4, 2019 12:10 PM

## 2019-09-04 ENCOUNTER — OFFICE VISIT (OUTPATIENT)
Dept: CARDIOLOGY | Facility: CLINIC | Age: 84
End: 2019-09-04

## 2019-09-04 VITALS
HEART RATE: 69 BPM | HEIGHT: 76 IN | WEIGHT: 258 LBS | BODY MASS INDEX: 31.42 KG/M2 | SYSTOLIC BLOOD PRESSURE: 142 MMHG | DIASTOLIC BLOOD PRESSURE: 88 MMHG

## 2019-09-04 DIAGNOSIS — I10 ESSENTIAL HYPERTENSION: ICD-10-CM

## 2019-09-04 DIAGNOSIS — Z95.810 S/P ICD (INTERNAL CARDIAC DEFIBRILLATOR) PROCEDURE: ICD-10-CM

## 2019-09-04 DIAGNOSIS — I42.8 OTHER CARDIOMYOPATHY (HCC): ICD-10-CM

## 2019-09-04 DIAGNOSIS — Z79.01 CURRENT USE OF LONG TERM ANTICOAGULATION: ICD-10-CM

## 2019-09-04 DIAGNOSIS — I48.0 PAF (PAROXYSMAL ATRIAL FIBRILLATION) (HCC): ICD-10-CM

## 2019-09-04 DIAGNOSIS — I49.5 SSS (SICK SINUS SYNDROME) (HCC): Primary | ICD-10-CM

## 2019-09-04 PROCEDURE — 93000 ELECTROCARDIOGRAM COMPLETE: CPT | Performed by: NURSE PRACTITIONER

## 2019-09-04 PROCEDURE — 99213 OFFICE O/P EST LOW 20 MIN: CPT | Performed by: NURSE PRACTITIONER

## 2019-09-04 NOTE — PATIENT INSTRUCTIONS
"DASH Eating Plan  DASH stands for \"Dietary Approaches to Stop Hypertension.\" The DASH eating plan is a healthy eating plan that has been shown to reduce high blood pressure (hypertension). It may also reduce your risk for type 2 diabetes, heart disease, and stroke. The DASH eating plan may also help with weight loss.  What are tips for following this plan?    General guidelines  · Avoid eating more than 2,300 mg (milligrams) of salt (sodium) a day. If you have hypertension, you may need to reduce your sodium intake to 1,500 mg a day.  · Limit alcohol intake to no more than 1 drink a day for nonpregnant women and 2 drinks a day for men. One drink equals 12 oz of beer, 5 oz of wine, or 1½ oz of hard liquor.  · Work with your health care provider to maintain a healthy body weight or to lose weight. Ask what an ideal weight is for you.  · Get at least 30 minutes of exercise that causes your heart to beat faster (aerobic exercise) most days of the week. Activities may include walking, swimming, or biking.  · Work with your health care provider or diet and nutrition specialist (dietitian) to adjust your eating plan to your individual calorie needs.  Reading food labels    · Check food labels for the amount of sodium per serving. Choose foods with less than 5 percent of the Daily Value of sodium. Generally, foods with less than 300 mg of sodium per serving fit into this eating plan.  · To find whole grains, look for the word \"whole\" as the first word in the ingredient list.  Shopping  · Buy products labeled as \"low-sodium\" or \"no salt added.\"  · Buy fresh foods. Avoid canned foods and premade or frozen meals.  Cooking  · Avoid adding salt when cooking. Use salt-free seasonings or herbs instead of table salt or sea salt. Check with your health care provider or pharmacist before using salt substitutes.  · Do not faust foods. Cook foods using healthy methods such as baking, boiling, grilling, and broiling instead.  · Cook with " heart-healthy oils, such as olive, canola, soybean, or sunflower oil.  Meal planning  · Eat a balanced diet that includes:  ? 5 or more servings of fruits and vegetables each day. At each meal, try to fill half of your plate with fruits and vegetables.  ? Up to 6-8 servings of whole grains each day.  ? Less than 6 oz of lean meat, poultry, or fish each day. A 3-oz serving of meat is about the same size as a deck of cards. One egg equals 1 oz.  ? 2 servings of low-fat dairy each day.  ? A serving of nuts, seeds, or beans 5 times each week.  ? Heart-healthy fats. Healthy fats called Omega-3 fatty acids are found in foods such as flaxseeds and coldwater fish, like sardines, salmon, and mackerel.  · Limit how much you eat of the following:  ? Canned or prepackaged foods.  ? Food that is high in trans fat, such as fried foods.  ? Food that is high in saturated fat, such as fatty meat.  ? Sweets, desserts, sugary drinks, and other foods with added sugar.  ? Full-fat dairy products.  · Do not salt foods before eating.  · Try to eat at least 2 vegetarian meals each week.  · Eat more home-cooked food and less restaurant, buffet, and fast food.  · When eating at a restaurant, ask that your food be prepared with less salt or no salt, if possible.  What foods are recommended?  The items listed may not be a complete list. Talk with your dietitian about what dietary choices are best for you.  Grains  Whole-grain or whole-wheat bread. Whole-grain or whole-wheat pasta. Brown rice. Oatmeal. Quinoa. Bulgur. Whole-grain and low-sodium cereals. Roopa bread. Low-fat, low-sodium crackers. Whole-wheat flour tortillas.  Vegetables  Fresh or frozen vegetables (raw, steamed, roasted, or grilled). Low-sodium or reduced-sodium tomato and vegetable juice. Low-sodium or reduced-sodium tomato sauce and tomato paste. Low-sodium or reduced-sodium canned vegetables.  Fruits  All fresh, dried, or frozen fruit. Canned fruit in natural juice (without  added sugar).  Meat and other protein foods  Skinless chicken or turkey. Ground chicken or turkey. Pork with fat trimmed off. Fish and seafood. Egg whites. Dried beans, peas, or lentils. Unsalted nuts, nut butters, and seeds. Unsalted canned beans. Lean cuts of beef with fat trimmed off. Low-sodium, lean deli meat.  Dairy  Low-fat (1%) or fat-free (skim) milk. Fat-free, low-fat, or reduced-fat cheeses. Nonfat, low-sodium ricotta or cottage cheese. Low-fat or nonfat yogurt. Low-fat, low-sodium cheese.  Fats and oils  Soft margarine without trans fats. Vegetable oil. Low-fat, reduced-fat, or light mayonnaise and salad dressings (reduced-sodium). Canola, safflower, olive, soybean, and sunflower oils. Avocado.  Seasoning and other foods  Herbs. Spices. Seasoning mixes without salt. Unsalted popcorn and pretzels. Fat-free sweets.  What foods are not recommended?  The items listed may not be a complete list. Talk with your dietitian about what dietary choices are best for you.  Grains  Baked goods made with fat, such as croissants, muffins, or some breads. Dry pasta or rice meal packs.  Vegetables  Creamed or fried vegetables. Vegetables in a cheese sauce. Regular canned vegetables (not low-sodium or reduced-sodium). Regular canned tomato sauce and paste (not low-sodium or reduced-sodium). Regular tomato and vegetable juice (not low-sodium or reduced-sodium). Pickles. Olives.  Fruits  Canned fruit in a light or heavy syrup. Fried fruit. Fruit in cream or butter sauce.  Meat and other protein foods  Fatty cuts of meat. Ribs. Fried meat. Gómez. Sausage. Bologna and other processed lunch meats. Salami. Fatback. Hotdogs. Bratwurst. Salted nuts and seeds. Canned beans with added salt. Canned or smoked fish. Whole eggs or egg yolks. Chicken or turkey with skin.  Dairy  Whole or 2% milk, cream, and half-and-half. Whole or full-fat cream cheese. Whole-fat or sweetened yogurt. Full-fat cheese. Nondairy creamers. Whipped toppings.  Processed cheese and cheese spreads.  Fats and oils  Butter. Stick margarine. Lard. Shortening. Ghee. Gómez fat. Tropical oils, such as coconut, palm kernel, or palm oil.  Seasoning and other foods  Salted popcorn and pretzels. Onion salt, garlic salt, seasoned salt, table salt, and sea salt. Worcestershire sauce. Tartar sauce. Barbecue sauce. Teriyaki sauce. Soy sauce, including reduced-sodium. Steak sauce. Canned and packaged gravies. Fish sauce. Oyster sauce. Cocktail sauce. Horseradish that you find on the shelf. Ketchup. Mustard. Meat flavorings and tenderizers. Bouillon cubes. Hot sauce and Tabasco sauce. Premade or packaged marinades. Premade or packaged taco seasonings. Relishes. Regular salad dressings.  Where to find more information:  · National Heart, Lung, and Blood [place]: www.nhlbi.nih.gov  · American Heart Association: www.heart.org  Summary  · The DASH eating plan is a healthy eating plan that has been shown to reduce high blood pressure (hypertension). It may also reduce your risk for type 2 diabetes, heart disease, and stroke.  · With the DASH eating plan, you should limit salt (sodium) intake to 2,300 mg a day. If you have hypertension, you may need to reduce your sodium intake to 1,500 mg a day.  · When on the DASH eating plan, aim to eat more fresh fruits and vegetables, whole grains, lean proteins, low-fat dairy, and heart-healthy fats.  · Work with your health care provider or diet and nutrition specialist (dietitian) to adjust your eating plan to your individual calorie needs.  This information is not intended to replace advice given to you by your health care provider. Make sure you discuss any questions you have with your health care provider.  Document Released: 12/06/2012 Document Revised: 12/11/2017 Document Reviewed: 12/11/2017  [employer] Patient Education © 2019 [employer]

## 2020-08-05 ENCOUNTER — OFFICE VISIT (OUTPATIENT)
Dept: CARDIOLOGY | Facility: CLINIC | Age: 85
End: 2020-08-05

## 2020-08-05 VITALS
DIASTOLIC BLOOD PRESSURE: 74 MMHG | BODY MASS INDEX: 31.34 KG/M2 | TEMPERATURE: 98.7 F | HEIGHT: 76 IN | HEART RATE: 66 BPM | SYSTOLIC BLOOD PRESSURE: 136 MMHG | WEIGHT: 257.4 LBS

## 2020-08-05 DIAGNOSIS — I49.5 SSS (SICK SINUS SYNDROME) (HCC): ICD-10-CM

## 2020-08-05 DIAGNOSIS — R06.09 DOE (DYSPNEA ON EXERTION): ICD-10-CM

## 2020-08-05 DIAGNOSIS — T45.515A WARFARIN-INDUCED COAGULOPATHY (HCC): ICD-10-CM

## 2020-08-05 DIAGNOSIS — I48.0 PAF (PAROXYSMAL ATRIAL FIBRILLATION) (HCC): ICD-10-CM

## 2020-08-05 DIAGNOSIS — R01.1 HEART MURMUR: ICD-10-CM

## 2020-08-05 DIAGNOSIS — I50.22 CHRONIC SYSTOLIC HEART FAILURE (HCC): ICD-10-CM

## 2020-08-05 DIAGNOSIS — I48.0 PAF (PAROXYSMAL ATRIAL FIBRILLATION) (HCC): Primary | ICD-10-CM

## 2020-08-05 DIAGNOSIS — I10 ESSENTIAL HYPERTENSION: ICD-10-CM

## 2020-08-05 DIAGNOSIS — R53.1 GENERALIZED WEAKNESS: ICD-10-CM

## 2020-08-05 DIAGNOSIS — D68.32 WARFARIN-INDUCED COAGULOPATHY (HCC): ICD-10-CM

## 2020-08-05 DIAGNOSIS — I42.8 OTHER CARDIOMYOPATHY (HCC): ICD-10-CM

## 2020-08-05 DIAGNOSIS — I35.1 AORTIC VALVE INSUFFICIENCY, ETIOLOGY OF CARDIAC VALVE DISEASE UNSPECIFIED: ICD-10-CM

## 2020-08-05 PROCEDURE — 93289 INTERROG DEVICE EVAL HEART: CPT | Performed by: INTERNAL MEDICINE

## 2020-08-05 PROCEDURE — 93290 INTERROG DEV EVAL ICPMS IP: CPT | Performed by: INTERNAL MEDICINE

## 2020-08-05 PROCEDURE — 99214 OFFICE O/P EST MOD 30 MIN: CPT | Performed by: NURSE PRACTITIONER

## 2020-08-05 NOTE — PROGRESS NOTES
Chief Complaint   Patient presents with   • Follow-up     cardiac management   • Pacemaker Check     St.[place]undo -checked today    • Shortness of Breath     With minimal exertion   • Med Refill     No refills  needed today. Had medication list today.       Subjective       Kyung Graves is a 88 y.o. male  PAF and non-ischemic cardiomyopathy with EF 35% diagnosed in the past for which he had a [employer] [place] ICD placed. He is anticoagulated with warfarin managed by PCP.  Cardiac cath repeated in 2015 showed normal coronaries. Echocardiogram on 3/21/18 showed questionable bicuspid AV, mod AI, AO root 4.9 cm.  On 4/1/19, he underwent single chamber AICD generator replacement due to battery at [facility].     Today he comes to the office for an office visit and pacemaker interrogation.     HPI     Cardiac History:    Past Surgical History:   Procedure Laterality Date   • CARDIAC CATHETERIZATION  07/21/2005    Cath-() Normal Coronaries. Normal EF   • CARDIAC CATHETERIZATION  02/13/2015    Cath-normal coronaries   • CARDIOVASCULAR STRESS TEST  01/14/2008    P.Myoview-() EF 34% Lateral Infarct   • CARDIOVASCULAR STRESS TEST  12/20/2013    L.Myoview-mild ischemia, Imdur, cath if symptoms persist   • ECHO - CONVERTED  06/14/2011    Echo-() EF35%. Inferior WMA.   • ECHO - CONVERTED  02/01/2012    Echo-EF 50-55%, aortic root dilation   • ECHO - CONVERTED  12/03/2013    Echo-EF 50%. RVSP-42mmHg.   • ECHO - CONVERTED  03/21/2018    EF 55%. ?Bicuspia AV. Mod AI. AO Root- 4.9   • PACEMAKER IMPLANTATION  05/04/2010    Implantable defibrillator /ICD-() St.Mundo   • US CAROTID UNILATERAL  06/14/2011    Carotid US-Normal       Current Outpatient Medications   Medication Sig Dispense Refill   • allopurinol (ZYLOPRIM) 300 MG tablet Take 300 mg by mouth daily.     • carvedilol (COREG) 3.125 MG tablet Take 3.125 mg by mouth 2 (Two) Times a Day With Meals.     • levothyroxine (SYNTHROID, LEVOTHROID) 100 MCG tablet TAKE ONE  "TABLET BY MOUTH ONCE DAILY (Patient taking differently: Take 112 mcg by mouth Daily.) 90 tablet 0   • warfarin (COUMADIN) 5 MG tablet Take 5 mg by mouth daily. PCP moniters     • warfarin (COUMADIN) 7.5 MG tablet Take 7.5 mg by mouth. As directed per PCP       No current facility-administered medications for this visit.        Patient has no known allergies.    Past Medical History:   Diagnosis Date   • A-fib (CMS/HCC)     Hx   • Aortic valve regurgitation     Hx   • Diabetes mellitus (CMS/HCC)    • Ejection fraction < 50%     Low   • History of prostate surgery     Laser surgery on prostate > 15yrs ago   • Hypertension    • ICD (implantable cardioverter-defibrillator) in place     ICD- placement ([facility]- 5/4/10)   • Sleep apnea     CPAP       Social History     Socioeconomic History   • Marital status:      Spouse name: Not on file   • Number of children: Not on file   • Years of education: Not on file   • Highest education level: Not on file   Tobacco Use   • Smoking status: Former Smoker   • Smokeless tobacco: Never Used   Substance and Sexual Activity   • Alcohol use: No   • Drug use: No       Family History   Problem Relation Age of Onset   • No Known Problems Mother    • No Known Problems Father    • Cancer Other        Review of Systems   Constitution: Positive for malaise/fatigue. Negative for decreased appetite and diaphoresis.   HENT: Negative for nosebleeds.    Eyes: Negative for blurred vision.   Cardiovascular: Positive for dyspnea on exertion and palpitations (\"little bit\"). Negative for chest pain, claudication, cyanosis, irregular heartbeat, leg swelling, near-syncope, orthopnea, paroxysmal nocturnal dyspnea and syncope.   Respiratory: Positive for sleep disturbances due to breathing (when sleeping on back sometimes). Negative for shortness of breath and snoring.    Endocrine: Negative for cold intolerance and heat intolerance.   Hematologic/Lymphatic: Negative for adenopathy. Does not " "bruise/bleed easily.   Skin: Negative for rash.   Musculoskeletal: Positive for falls (month ago, tripped over vine). Negative for myalgias.   Gastrointestinal: Positive for abdominal pain (left side, recently seen GI) and dysphagia. Negative for heartburn, melena and nausea.   Genitourinary: Positive for flank pain. Negative for dysuria and hematuria.   Neurological: Positive for loss of balance (uses cane). Negative for dizziness and light-headedness.   Psychiatric/Behavioral: The patient does not have insomnia and is not nervous/anxious.         Objective     /74 (BP Location: Right arm)   Pulse 66   Temp 98.7 °F (37.1 °C)   Ht 193 cm (76\")   Wt 117 kg (257 lb 6.4 oz)   BMI 31.33 kg/m²     Physical Exam     Procedures        Assessment/Plan      Kuyng was seen today for follow-up, pacemaker check, shortness of breath and med refill.    Diagnoses and all orders for this visit:    Essential hypertension    SSS (sick sinus syndrome) (CMS/HCC)    Other cardiomyopathy (CMS/HCC)    Chronic systolic heart failure (CMS/HCC)    PAF (paroxysmal atrial fibrillation) (CMS/HCC)    Warfarin-induced coagulopathy (CMS/HCC)        [facility] in case experiences another [facility].     Patient's Body mass index is 31.33 kg/m². BMI is {BMI range:63013}.       Kyung Graves  reports that he has quit smoking. He has never used smokeless tobacco.. I have educated him on the risk of diseases from using tobacco products such as {Tobacco Cessation Diseases:57247::\"cancer\",\"COPD\",\"heart diease\"}.     I advised him to quit and he is {Willing/Not Willing to Quit Tobacco Products:64157}.    I spent {Time Spent Tobacco :06790} minutes counseling the patient.                     Electronically signed by PAULO Carroll,  August 5, 2020 13:01  "

## 2020-08-05 NOTE — PROGRESS NOTES
"Chief Complaint   Patient presents with   • Follow-up     cardiac management   • Pacemaker Check     St[employer]Mundo -checked today    • Shortness of Breath     With minimal exertion   • Med Refill     No refills  needed today. Had medication list today.       Subjective       Kyung Graves is a 88 y.o. male with a history of PAF and non-ischemic cardiomyopathy with EF 35% diagnosed in the past for which he had an ICD placed. He is anticoagulated with warfarin managed by PCP.  Cardiac cath repeated in 2015 showed normal coronaries. Echocardiogram on 3/21/18 showed questionable bicuspid AV, mod AI, AO root 4.9 cm. Pacemaker interrogation 3/20/19 showed battery at [facility]. He was referred to Dr. Willson. On 4/1/19, he underwent single chamber AICD generator replacement.    Today comes to the office for follow-up visit.  He is concerned over decrease in effort tolerance.  With minimal exertion he experiences dyspnea.  He does have swelling in his lower legs but denies being worse than prior.  He states \"I believe I need to have my heart looked at\".       Cardiac History:    Past Surgical History:   Procedure Laterality Date   • CARDIAC CATHETERIZATION  07/21/2005    Cath-() Normal Coronaries. Normal EF   • CARDIAC CATHETERIZATION  02/13/2015    Cath-normal coronaries   • CARDIOVASCULAR STRESS TEST  01/14/2008    P.[place]-() EF 34% Lateral Infarct   • CARDIOVASCULAR STRESS TEST  12/20/2013    L.Myoview-mild ischemia, Imdur, cath if symptoms persist   • ECHO - CONVERTED  06/14/2011    Echo-() EF35%. Inferior WMA.   • ECHO - CONVERTED  02/01/2012    Echo-EF 50-55%, aortic root dilation   • ECHO - CONVERTED  12/03/2013    Echo-EF 50%. RVSP-42mmHg.   • ECHO - CONVERTED  03/21/2018    EF 55%. ?Bicuspia AV. Mod AI. AO Root- 4.9   • PACEMAKER IMPLANTATION  05/04/2010    Implantable defibrillator /ICD-() [employer]   • US CAROTID UNILATERAL  06/14/2011    Carotid US-Normal       Current Outpatient " Medications   Medication Sig Dispense Refill   • allopurinol (ZYLOPRIM) 300 MG tablet Take 300 mg by mouth daily.     • carvedilol (COREG) 3.125 MG tablet Take 3.125 mg by mouth 2 (Two) Times a Day With Meals.     • levothyroxine (SYNTHROID, LEVOTHROID) 100 MCG tablet TAKE ONE TABLET BY MOUTH ONCE DAILY (Patient taking differently: Take 112 mcg by mouth Daily.) 90 tablet 0   • warfarin (COUMADIN) 5 MG tablet Take 5 mg by mouth daily. PCP moniters     • warfarin (COUMADIN) 7.5 MG tablet Take 7.5 mg by mouth. As directed per PCP       No current facility-administered medications for this visit.        Patient has no known allergies.    Past Medical History:   Diagnosis Date   • A-fib (CMS/HCC)     Hx   • Aortic valve regurgitation     Hx   • Diabetes mellitus (CMS/HCC)    • Ejection fraction < 50%     Low   • History of prostate surgery     Laser surgery on prostate > 15yrs ago   • Hypertension    • ICD (implantable cardioverter-defibrillator) in place     ICD- placement ([facility] 5/4/10)   • Sleep apnea     CPAP       Social History     Socioeconomic History   • Marital status:      Spouse name: Not on file   • Number of children: Not on file   • Years of education: Not on file   • Highest education level: Not on file   Tobacco Use   • Smoking status: Former Smoker   • Smokeless tobacco: Never Used   Substance and Sexual Activity   • Alcohol use: No   • Drug use: No       Family History   Problem Relation Age of Onset   • No Known Problems Mother    • No Known Problems Father    • Cancer Other        Review of Systems   Constitution: Positive for malaise/fatigue. Negative for decreased appetite, diaphoresis and fever.   Eyes: Negative for visual disturbance.   Cardiovascular: Positive for dyspnea on exertion and leg swelling. Negative for chest pain, near-syncope and palpitations.   Respiratory: Positive for shortness of breath. Negative for sleep disturbances due to breathing.    Hematologic/Lymphatic: Negative  "for bleeding problem. Does not bruise/bleed easily.   Skin: Negative.    Musculoskeletal: Positive for arthritis, joint pain (not a new or worsening issue) and muscle weakness. Negative for myalgias.   Neurological: Positive for light-headedness, loss of balance (uses cane for ambulation) and weakness. Negative for dizziness.   Psychiatric/Behavioral: Negative for memory loss. The patient does not have insomnia and is not nervous/anxious.         Objective     /74 (BP Location: Right arm)   Pulse 66   Temp 98.7 °F (37.1 °C)   Ht 193 cm (76\")   Wt 117 kg (257 lb 6.4 oz)   BMI 31.33 kg/m²     Physical Exam   Constitutional: He is oriented to person, place, and time. He appears well-nourished.   HENT:   Head: Normocephalic.   Eyes: Pupils are equal, round, and reactive to light.   Neck: Normal range of motion.   Cardiovascular: Normal rate, regular rhythm, S1 normal and S2 normal.   Murmur heard.   Decrescendo diastolic murmur is present with a grade of 3/6 at the upper right sternal border radiating to the apex.  Pulses:       Radial pulses are 2+ on the right side, and 2+ on the left side.   Pulmonary/Chest: Breath sounds normal.   Abdominal: Soft. Bowel sounds are normal.   Musculoskeletal: Normal range of motion.   Neurological: He is alert and oriented to person, place, and time.   Skin: Skin is warm.   Psychiatric: He has a normal mood and affect.        Procedures: pacemaker interrogation      Problem List Items Addressed This Visit        Cardiovascular and Mediastinum    Cardiomyopathy (CMS/HCC)    Relevant Orders    Adult Transthoracic Echo Complete W/ Cont if Necessary Per Protocol    Stress Test With Myocardial Perfusion One Day    HTN (hypertension)    PAF (paroxysmal atrial fibrillation) (CMS/HCC)    Relevant Orders    Adult Transthoracic Echo Complete W/ Cont if Necessary Per Protocol    Stress Test With Myocardial Perfusion One Day    SSS (sick sinus syndrome) (CMS/HCC)    Heart murmur    " Relevant Orders    Adult Transthoracic Echo Complete W/ Cont if Necessary Per Protocol    Chronic systolic heart failure (CMS/HCC)    Relevant Orders    Adult Transthoracic Echo Complete W/ Cont if Necessary Per Protocol    Stress Test With Myocardial Perfusion One Day       Hematopoietic and Hemostatic    Warfarin-induced coagulopathy (CMS/HCC)    Relevant Orders    Adult Transthoracic Echo Complete W/ Cont if Necessary Per Protocol    Stress Test With Myocardial Perfusion One Day      Other Visit Diagnoses     Aortic valve insufficiency, etiology of cardiac valve disease unspecified        Relevant Orders    Adult Transthoracic Echo Complete W/ Cont if Necessary Per Protocol    GREWAL (dyspnea on exertion)        Generalized weakness             Patient presents today with concern over worsening shortness of breath, decreased effort tolerance, and generalized weakness.  He denies dizziness or chest pain.  He does experience lightheadedness with position change.  To relook at aortic insufficiency and overall LV function and repeat echocardiogram ordered.  To relook for ischemic cause of issues and Lexiscan stress test ordered.    Pacemaker interrogation done today with report pending.  Repeat interrogation scheduled to be done in 6 months unless advised otherwise based on report.    Due to atrial fibrillation he is on warfarin therapy for stroke prophylaxis.  Is no signs of bleeding noted.  [facility] and [facility].  I do not have a copy of recent lab results.  Please forward any recent lab reports?    Blood pressure stable.  Continue same cardiac medications.  No refills needed.    Patient's Body mass index is 31.33 kg/m². BMI is above normal parameters. Recommendations include: nutrition counseling.  Patient admits to adequate water intake to maintain hydration.  Heart healthy diet encouraged.     Further recommendations based on cardiac work-up.  Otherwise, we will see him back in 6 months.   Please call sooner for cardiac concerns.           Electronically signed by PAULO Carroll,  August 6, 2020 10:40

## 2020-08-14 ENCOUNTER — HOSPITAL ENCOUNTER (OUTPATIENT)
Dept: CARDIOLOGY | Facility: HOSPITAL | Age: 85
Discharge: HOME | End: 2020-08-14

## 2020-08-14 VITALS — BODY MASS INDEX: 31.41 KG/M2 | HEIGHT: 76 IN | WEIGHT: 257.94 LBS

## 2020-08-14 DIAGNOSIS — I50.22 CHRONIC SYSTOLIC HEART FAILURE (HCC): ICD-10-CM

## 2020-08-14 DIAGNOSIS — I42.8 OTHER CARDIOMYOPATHY (HCC): ICD-10-CM

## 2020-08-14 DIAGNOSIS — T45.515A WARFARIN-INDUCED COAGULOPATHY (HCC): ICD-10-CM

## 2020-08-14 DIAGNOSIS — D68.32 WARFARIN-INDUCED COAGULOPATHY (HCC): ICD-10-CM

## 2020-08-14 DIAGNOSIS — R01.1 HEART MURMUR: ICD-10-CM

## 2020-08-14 DIAGNOSIS — I35.1 AORTIC VALVE INSUFFICIENCY, ETIOLOGY OF CARDIAC VALVE DISEASE UNSPECIFIED: ICD-10-CM

## 2020-08-14 DIAGNOSIS — I48.0 PAF (PAROXYSMAL ATRIAL FIBRILLATION) (HCC): ICD-10-CM

## 2020-08-14 PROCEDURE — 93306 TTE W/DOPPLER COMPLETE: CPT | Performed by: INTERNAL MEDICINE

## 2020-08-14 PROCEDURE — 0 TECHNETIUM SESTAMIBI: Performed by: INTERNAL MEDICINE

## 2020-08-14 PROCEDURE — 93017 CV STRESS TEST TRACING ONLY: CPT

## 2020-08-14 PROCEDURE — A9500 TC99M SESTAMIBI: HCPCS | Performed by: INTERNAL MEDICINE

## 2020-08-14 PROCEDURE — 78452 HT MUSCLE IMAGE SPECT MULT: CPT

## 2020-08-14 PROCEDURE — 93018 CV STRESS TEST I&R ONLY: CPT | Performed by: INTERNAL MEDICINE

## 2020-08-14 PROCEDURE — 78452 HT MUSCLE IMAGE SPECT MULT: CPT | Performed by: INTERNAL MEDICINE

## 2020-08-14 PROCEDURE — 93016 CV STRESS TEST SUPVJ ONLY: CPT | Performed by: NURSE PRACTITIONER

## 2020-08-14 PROCEDURE — 93306 TTE W/DOPPLER COMPLETE: CPT

## 2020-08-14 PROCEDURE — 93356 MYOCRD STRAIN IMG SPCKL TRCK: CPT | Performed by: INTERNAL MEDICINE

## 2020-08-14 PROCEDURE — 25010000002 REGADENOSON 0.4 MG/5ML SOLUTION: Performed by: INTERNAL MEDICINE

## 2020-08-14 RX ADMIN — TECHNETIUM TC 99M SESTAMIBI 1 DOSE: 1 INJECTION INTRAVENOUS at 08:39

## 2020-08-14 RX ADMIN — REGADENOSON 0.4 MG: 0.08 INJECTION, SOLUTION INTRAVENOUS at 08:39

## 2020-08-15 LAB
AORTIC DIMENSIONLESS INDEX: 0.9 (DI)
BH CV ECHO MEAS - ACS: 2.1 CM
BH CV ECHO MEAS - AI DEC SLOPE: 145 CM/SEC^2
BH CV ECHO MEAS - AI MAX PG: 43.8 MMHG
BH CV ECHO MEAS - AI MAX VEL: 331 CM/SEC
BH CV ECHO MEAS - AI P1/2T: 668.6 MSEC
BH CV ECHO MEAS - AO MAX PG (FULL): 0.26 MMHG
BH CV ECHO MEAS - AO MAX PG: 3 MMHG
BH CV ECHO MEAS - AO MEAN PG (FULL): 0 MMHG
BH CV ECHO MEAS - AO MEAN PG: 1 MMHG
BH CV ECHO MEAS - AO ROOT AREA (BSA CORRECTED): 1.8
BH CV ECHO MEAS - AO ROOT AREA: 15.2 CM^2
BH CV ECHO MEAS - AO ROOT DIAM: 4.4 CM
BH CV ECHO MEAS - AO V2 MAX: 86.1 CM/SEC
BH CV ECHO MEAS - AO V2 MEAN: 53.4 CM/SEC
BH CV ECHO MEAS - AO V2 VTI: 19.6 CM
BH CV ECHO MEAS - BSA(HAYCOCK): 2.5 M^2
BH CV ECHO MEAS - BSA: 2.5 M^2
BH CV ECHO MEAS - BZI_BMI: 31.3 KILOGRAMS/M^2
BH CV ECHO MEAS - BZI_METRIC_HEIGHT: 193 CM
BH CV ECHO MEAS - BZI_METRIC_WEIGHT: 116.6 KG
BH CV ECHO MEAS - EDV(CUBED): 98.6 ML
BH CV ECHO MEAS - EDV(TEICH): 98.3 ML
BH CV ECHO MEAS - EF(CUBED): 74.8 %
BH CV ECHO MEAS - EF(MOD-BP): 59 %
BH CV ECHO MEAS - EF(TEICH): 66.7 %
BH CV ECHO MEAS - ESV(CUBED): 24.9 ML
BH CV ECHO MEAS - ESV(TEICH): 32.8 ML
BH CV ECHO MEAS - FS: 36.8 %
BH CV ECHO MEAS - IVS/LVPW: 1.2
BH CV ECHO MEAS - IVSD: 1.5 CM
BH CV ECHO MEAS - LA DIMENSION: 3.9 CM
BH CV ECHO MEAS - LA/AO: 0.89
BH CV ECHO MEAS - LAT PEAK E' VEL: 8.2 CM/SEC
BH CV ECHO MEAS - LV IVRT: 0.1 SEC
BH CV ECHO MEAS - LV MASS(C)D: 248.9 GRAMS
BH CV ECHO MEAS - LV MASS(C)DI: 101 GRAMS/M^2
BH CV ECHO MEAS - LV MAX PG: 2.7 MMHG
BH CV ECHO MEAS - LV MEAN PG: 1 MMHG
BH CV ECHO MEAS - LV V1 MAX: 82.2 CM/SEC
BH CV ECHO MEAS - LV V1 MEAN: 53.6 CM/SEC
BH CV ECHO MEAS - LV V1 VTI: 21.2 CM
BH CV ECHO MEAS - LVIDD: 4.6 CM
BH CV ECHO MEAS - LVIDS: 2.9 CM
BH CV ECHO MEAS - LVPWD: 1.2 CM
BH CV ECHO MEAS - MED PEAK E' VEL: 6.4 CM/SEC
BH CV ECHO MEAS - MR MAX PG: 63.7 MMHG
BH CV ECHO MEAS - MR MAX VEL: 399 CM/SEC
BH CV ECHO MEAS - MV DEC SLOPE: 314 CM/SEC^2
BH CV ECHO MEAS - MV DEC TIME: 0.16 SEC
BH CV ECHO MEAS - MV E MAX VEL: 76.5 CM/SEC
BH CV ECHO MEAS - MV MAX PG: 2.7 MMHG
BH CV ECHO MEAS - MV MEAN PG: 1 MMHG
BH CV ECHO MEAS - MV P1/2T MAX VEL: 91.8 CM/SEC
BH CV ECHO MEAS - MV P1/2T: 85.6 MSEC
BH CV ECHO MEAS - MV V2 MAX: 81.8 CM/SEC
BH CV ECHO MEAS - MV V2 MEAN: 43.4 CM/SEC
BH CV ECHO MEAS - MV V2 VTI: 21.3 CM
BH CV ECHO MEAS - MVA P1/2T LCG: 2.4 CM^2
BH CV ECHO MEAS - MVA(P1/2T): 2.6 CM^2
BH CV ECHO MEAS - PA MAX PG (FULL): 0.74 MMHG
BH CV ECHO MEAS - PA MAX PG: 2 MMHG
BH CV ECHO MEAS - PA MEAN PG (FULL): 0 MMHG
BH CV ECHO MEAS - PA MEAN PG: 1 MMHG
BH CV ECHO MEAS - PA V2 MAX: 70.3 CM/SEC
BH CV ECHO MEAS - PA V2 MEAN: 43.6 CM/SEC
BH CV ECHO MEAS - PA V2 VTI: 14.5 CM
BH CV ECHO MEAS - RAP SYSTOLE: 10 MMHG
BH CV ECHO MEAS - RV MAX PG: 1.2 MMHG
BH CV ECHO MEAS - RV MEAN PG: 1 MMHG
BH CV ECHO MEAS - RV V1 MAX: 55.5 CM/SEC
BH CV ECHO MEAS - RV V1 MEAN: 35.2 CM/SEC
BH CV ECHO MEAS - RV V1 VTI: 8.8 CM
BH CV ECHO MEAS - RVDD: 3.8 CM
BH CV ECHO MEAS - RVSP: 49 MMHG
BH CV ECHO MEAS - SI(AO): 120.9 ML/M^2
BH CV ECHO MEAS - SI(CUBED): 29.9 ML/M^2
BH CV ECHO MEAS - SI(TEICH): 26.6 ML/M^2
BH CV ECHO MEAS - SV(AO): 298 ML
BH CV ECHO MEAS - SV(CUBED): 73.7 ML
BH CV ECHO MEAS - SV(TEICH): 65.6 ML
BH CV ECHO MEAS - TR MAX VEL: 311 CM/SEC
BH CV ECHO MEASUREMENTS AVERAGE E/E' RATIO: 10.48
BH CV STRESS COMMENTS STAGE 1: NORMAL
BH CV STRESS DOSE REGADENOSON STAGE 1: 0.4
BH CV STRESS DURATION MIN STAGE 1: 0
BH CV STRESS DURATION SEC STAGE 1: 10
BH CV STRESS PROTOCOL 1: NORMAL
BH CV STRESS RECOVERY BP: NORMAL MMHG
BH CV STRESS RECOVERY HR: 58 BPM
BH CV STRESS STAGE 1: 1
LV EF NUC BP: 58 %
MAXIMAL PREDICTED HEART RATE: 132 BPM
MAXIMAL PREDICTED HEART RATE: 132 BPM
PERCENT MAX PREDICTED HR: 50.76 %
STRESS BASELINE BP: NORMAL MMHG
STRESS BASELINE HR: 59 BPM
STRESS PERCENT HR: 60 %
STRESS POST PEAK BP: NORMAL MMHG
STRESS POST PEAK HR: 67 BPM
STRESS TARGET HR: 112 BPM
STRESS TARGET HR: 112 BPM

## 2020-08-17 RX ORDER — LISINOPRIL 2.5 MG/1
2.5 TABLET ORAL DAILY
Qty: 30 TABLET | Refills: 11 | Status: SHIPPED | OUTPATIENT
Start: 2020-08-17 | End: 2021-05-19 | Stop reason: SINTOL

## 2020-08-17 RX ORDER — HYDROCHLOROTHIAZIDE 12.5 MG/1
12.5 CAPSULE, GELATIN COATED ORAL DAILY
Qty: 30 CAPSULE | Refills: 11 | Status: SHIPPED | OUTPATIENT
Start: 2020-08-17 | End: 2021-10-11

## 2021-05-19 ENCOUNTER — OFFICE VISIT (OUTPATIENT)
Dept: CARDIOLOGY | Facility: CLINIC | Age: 86
End: 2021-05-19

## 2021-05-19 VITALS
WEIGHT: 246 LBS | SYSTOLIC BLOOD PRESSURE: 142 MMHG | DIASTOLIC BLOOD PRESSURE: 72 MMHG | BODY MASS INDEX: 29.96 KG/M2 | HEIGHT: 76 IN | HEART RATE: 64 BPM

## 2021-05-19 DIAGNOSIS — I42.8 OTHER CARDIOMYOPATHY (HCC): Primary | ICD-10-CM

## 2021-05-19 DIAGNOSIS — I50.22 CHRONIC SYSTOLIC HEART FAILURE (HCC): ICD-10-CM

## 2021-05-19 DIAGNOSIS — I10 ESSENTIAL HYPERTENSION: ICD-10-CM

## 2021-05-19 DIAGNOSIS — Z95.810 S/P ICD (INTERNAL CARDIAC DEFIBRILLATOR) PROCEDURE: ICD-10-CM

## 2021-05-19 DIAGNOSIS — R60.0 BILATERAL LEG EDEMA: ICD-10-CM

## 2021-05-19 DIAGNOSIS — I49.5 SSS (SICK SINUS SYNDROME) (HCC): ICD-10-CM

## 2021-05-19 DIAGNOSIS — I48.0 PAF (PAROXYSMAL ATRIAL FIBRILLATION) (HCC): Primary | ICD-10-CM

## 2021-05-19 DIAGNOSIS — D68.32 WARFARIN-INDUCED COAGULOPATHY (HCC): ICD-10-CM

## 2021-05-19 DIAGNOSIS — R01.1 HEART MURMUR: ICD-10-CM

## 2021-05-19 DIAGNOSIS — T45.515A WARFARIN-INDUCED COAGULOPATHY (HCC): ICD-10-CM

## 2021-05-19 DIAGNOSIS — I48.0 PAF (PAROXYSMAL ATRIAL FIBRILLATION) (HCC): ICD-10-CM

## 2021-05-19 PROCEDURE — 99213 OFFICE O/P EST LOW 20 MIN: CPT | Performed by: NURSE PRACTITIONER

## 2021-05-19 PROCEDURE — 93289 INTERROG DEVICE EVAL HEART: CPT | Performed by: INTERNAL MEDICINE

## 2021-05-19 RX ORDER — LEVOTHYROXINE SODIUM 112 UG/1
112 TABLET ORAL DAILY
COMMUNITY

## 2021-05-19 RX ORDER — WARFARIN SODIUM 6 MG/1
6 TABLET ORAL
COMMUNITY

## 2021-05-19 NOTE — PROGRESS NOTES
Chief Complaint   Patient presents with   • Med Refill     Cardia managment  .Has  no cardiac complaints today. Had multiple  falls at home  with a fall in October 2020 resulting in hospital stay  at [facility]   • Atrial Fibrillation     Reports no afib since last office visit.   • Pacemaker Check     [employer] Device  check   • LABS     PCP obtained labs    • Edema     Has pitting edema to bilat ankles.   • Med Refill     No refills needed .Had med bottles today       Subjective       Kyung Graves is a 88 y.o. male  with a history of PAF and non-ischemic cardiomyopathy with EF 35% diagnosed in the past for which he had an ICD placed. He is anticoagulated with warfarin managed by PCP.  Cardiac cath repeated in 2015 showed normal coronaries. Echocardiogram on 3/21/18 showed questionable bicuspid AV, mod AI, AO root 4.9 cm. On 4/1/19, he underwent single chamber AICD generator replacement. On 8/5/2020 [employer] ICD interrogation showed normal function with no events and battery life of 7.7 years. On 8/13/2020 underwent Lexiscan stress test and echocardiogram and no obvious ischemia was noted.  LVEF was 60%.    Today he comes to the office for a follow-up visit accompanied by his daughter.  He admits to falling 3 or 4 times.  In October 2020 he required hospitalization after a fall and developed bowel blockage requiring 12-day stay.  Historically he has issues with diarrhea but is now hesitant to take medication to manage.  From a cardiac standpoint he denies chest pain or palpitations.  With exertion he is to shortness of breath same as prior.  He has noticed some swelling in his lower legs which resolved with elevation.    Cardiac History:    Past Surgical History:   Procedure Laterality Date   • CARDIAC CATHETERIZATION  07/21/2005    Cath-() Normal Coronaries. Normal EF   • CARDIAC CATHETERIZATION  02/13/2015    Cath-normal coronaries   • CARDIOVASCULAR STRESS TEST  01/14/2008    PEdwinMyoview-()  EF 34% Lateral Infarct   • CARDIOVASCULAR STRESS TEST  12/20/2013    L.Myoview-mild ischemia, Imdur, cath if symptoms persist   • CARDIOVASCULAR STRESS TEST  08/13/2020    Lexiscan- EF 58%. Diaphramatic attenuation   • ECHO - CONVERTED  06/14/2011    Echo-() EF35%. Inferior WMA.   • ECHO - CONVERTED  02/01/2012    Echo-EF 50-55%, aortic root dilation   • ECHO - CONVERTED  12/03/2013    Echo-EF 50%. RVSP-42mmHg.   • ECHO - CONVERTED  03/21/2018    EF 55%. ?Bicuspia AV. Mod AI. AO Root- 4.9   • ECHO - CONVERTED  08/13/2020    TLS. EF 60%. LA- 3.9 Cm Mild MR & AI, AO- 4.4 Cm. RVSP- 49 mmHg   • PACEMAKER IMPLANTATION  05/04/2010    Implantable defibrillator /ICD-() [employer]   • US CAROTID UNILATERAL  06/14/2011    Carotid US-Normal       Current Outpatient Medications   Medication Sig Dispense Refill   • allopurinol (ZYLOPRIM) 300 MG tablet Take 300 mg by mouth daily.     • carvedilol (COREG) 3.125 MG tablet Take 3.125 mg by mouth 2 (Two) Times a Day With Meals.     • hydroCHLOROthiazide (MICROZIDE) 12.5 MG capsule Take 1 capsule by mouth Daily. 30 capsule 11   • levothyroxine (SYNTHROID, LEVOTHROID) 112 MCG tablet Take 112 mcg by mouth Daily.     • warfarin (COUMADIN) 6 MG tablet Take 6 mg by mouth Daily.       No current facility-administered medications for this visit.       Patient has no known allergies.    Past Medical History:   Diagnosis Date   • A-fib (CMS/HCC)     Hx   • Aortic valve regurgitation     Hx   • Diabetes mellitus (CMS/HCC)    • Ejection fraction < 50%     Low   • History of prostate surgery     Laser surgery on prostate > 15yrs ago   • Hypertension    • ICD (implantable cardioverter-defibrillator) in place     ICD- placement (JUAN- 5/4/10)   • Sleep apnea     CPAP       Social History     Socioeconomic History   • Marital status:      Spouse name: Not on file   • Number of children: Not on file   • Years of education: Not on file   • Highest education level: Not on file    Tobacco Use   • Smoking status: Former Smoker   • Smokeless tobacco: Never Used   Vaping Use   • Vaping Use: Never used   Substance and Sexual Activity   • Alcohol use: No   • Drug use: No       Family History   Problem Relation Age of Onset   • No Known Problems Mother    • No Known Problems Father    • Cancer Other        Review of Systems   Constitutional: Positive for malaise/fatigue. Negative for decreased appetite and diaphoresis.   HENT: Positive for hearing loss. Negative for nosebleeds.    Eyes: Negative for blurred vision.   Cardiovascular: Positive for leg swelling. Negative for chest pain, claudication, cyanosis, dyspnea on exertion, irregular heartbeat, near-syncope, orthopnea, palpitations, paroxysmal nocturnal dyspnea and syncope.   Respiratory: Positive for shortness of breath. Negative for cough.    Endocrine: Negative for cold intolerance and heat intolerance.   Hematologic/Lymphatic: Negative for adenopathy and bleeding problem. Bruises/bleeds easily.   Skin: Negative for rash.   Musculoskeletal: Positive for falls. Negative for myalgias.   Gastrointestinal: Positive for diarrhea. Negative for abdominal pain, heartburn, melena and nausea.   Genitourinary: Negative for dysuria and hematuria.   Neurological: Positive for light-headedness, loss of balance (uses cane or walker to help avoid falls) and weakness. Negative for dizziness.   Psychiatric/Behavioral: Negative for memory loss. The patient does not have insomnia and is not nervous/anxious.         BP Readings from Last 5 Encounters:   05/19/21 142/72   08/05/20 136/74   09/04/19 142/88   03/20/19 140/100   09/04/18 118/82       Wt Readings from Last 5 Encounters:   05/19/21 112 kg (246 lb)   08/14/20 117 kg (257 lb 15 oz)   08/05/20 117 kg (257 lb 6.4 oz)   09/04/19 117 kg (258 lb)   03/20/19 118 kg (260 lb 6 oz)       Objective      Labs 9/11/2020: WBC 7.8, RBC 3, platelets 199, calcium 7.5, glucose 181, BUN 30, total protein 5.5, albumin  "2.3, total bili 2.6, , sodium 134, potassium 3.9, chloride 101, carbon dioxide 28, creatinine 1.16, GFR 63, AST 26, ALT 13,    /72 (BP Location: Left arm)   Pulse 64   Ht 193 cm (75.98\")   Wt 112 kg (246 lb)   BMI 29.96 kg/m²     Vitals and nursing note reviewed.   Eyes:      Pupils: Pupils are equal, round, and reactive to light.   HENT:      Head: Normocephalic.   Neck:      Vascular: No carotid bruit.   Pulmonary:      Breath sounds: Normal breath sounds.   Cardiovascular:      Normal rate. Regular rhythm. loud S2.      Murmurs: There is a grade 2/6 systolic murmur.   Edema:     Pretibial: bilateral 1+ edema of the pretibial area.     Ankle: bilateral 1+ edema of the ankle.  Abdominal:      General: Bowel sounds are normal.      Palpations: Abdomen is soft.   Musculoskeletal: Normal range of motion.      Cervical back: Normal range of motion. Skin:     General: Skin is warm.   Neurological:      Mental Status: Alert and oriented to person, place, and time.          Procedures: pacemaker interrogation done today          Assessment/Plan   Diagnoses and all orders for this visit:    1. Other cardiomyopathy (CMS/HCC) (Primary)    2. Chronic systolic heart failure (CMS/HCC)    3. SSS (sick sinus syndrome) (CMS/HCC)    4. S/P ICD (internal cardiac defibrillator) procedure    5. PAF (paroxysmal atrial fibrillation) (CMS/HCC)    6. Essential hypertension    7. Warfarin-induced coagulopathy (CMS/HCC)    8. Bilateral leg edema      From a cardiac standpoint patient presents without symptoms or concerns voiced.  The reports of his stress test and echocardiogram done last year were reviewed.  His blood pressure is stable.  Heart rate and rhythm are normal.  He does have mild swelling in his lower legs.  At this time continue low-dose hydrochlorothiazide and periodic elevation of legs.  Continue low-dose Coreg. Monitor BP and for any worsening edema.  DASH diet also encouraged.    ICD interrogation done " today was normal with battery life over 7 years.  A repeat interrogation scheduled to be done in 6 months.    For management of PAF he remains on warfarin therapy without signs of bleeding.  He will follow with you for management of PT/INR.  According to patient most recent INR was therapeutic at 2.8.    Patient's Body mass index is 29.96 kg/m². indicating that he is overweight (BMI 25-29.9). Obesity-related health conditions include the following: hypertension and dyslipidemias. Obesity is improving with treatment. BMI is is above average; BMI management plan is completed. We discussed portion control.  His weight is down 11 pounds since last office visit.  Currently he admits to good appetite.  He does have issues with diarrhea which could be contributing factor and [facility]     Patient appears stable from a cardiac standpoint.  A 6-month follow-up visit scheduled.  Please call sooner for chronic concerns.                     Electronically signed by PAULO Carroll,  May 19, 2021 15:17 EDT

## 2021-10-11 RX ORDER — HYDROCHLOROTHIAZIDE 12.5 MG/1
CAPSULE, GELATIN COATED ORAL
Qty: 90 CAPSULE | Refills: 0 | Status: SHIPPED | OUTPATIENT
Start: 2021-10-11 | End: 2022-02-23

## 2022-02-23 RX ORDER — HYDROCHLOROTHIAZIDE 12.5 MG/1
CAPSULE, GELATIN COATED ORAL
Qty: 90 CAPSULE | Refills: 0 | Status: SHIPPED | OUTPATIENT
Start: 2022-02-23 | End: 2022-06-16

## 2022-06-16 DIAGNOSIS — I50.22 CHRONIC SYSTOLIC HEART FAILURE: Primary | ICD-10-CM

## 2022-06-16 DIAGNOSIS — I48.0 PAF (PAROXYSMAL ATRIAL FIBRILLATION): ICD-10-CM

## 2022-06-16 RX ORDER — HYDROCHLOROTHIAZIDE 12.5 MG/1
CAPSULE, GELATIN COATED ORAL
Qty: 30 CAPSULE | Refills: 0 | Status: SHIPPED | OUTPATIENT
Start: 2022-06-16 | End: 2022-09-14

## 2022-07-06 ENCOUNTER — OFFICE VISIT (OUTPATIENT)
Dept: CARDIOLOGY | Facility: CLINIC | Age: 87
End: 2022-07-06

## 2022-07-06 VITALS
DIASTOLIC BLOOD PRESSURE: 70 MMHG | HEART RATE: 62 BPM | HEIGHT: 76 IN | SYSTOLIC BLOOD PRESSURE: 120 MMHG | WEIGHT: 242 LBS | BODY MASS INDEX: 29.47 KG/M2

## 2022-07-06 DIAGNOSIS — I49.5 SSS (SICK SINUS SYNDROME): Primary | ICD-10-CM

## 2022-07-06 DIAGNOSIS — I10 PRIMARY HYPERTENSION: ICD-10-CM

## 2022-07-06 DIAGNOSIS — Z95.810 S/P ICD (INTERNAL CARDIAC DEFIBRILLATOR) PROCEDURE: ICD-10-CM

## 2022-07-06 DIAGNOSIS — T45.515A WARFARIN-INDUCED COAGULOPATHY: ICD-10-CM

## 2022-07-06 DIAGNOSIS — D68.32 WARFARIN-INDUCED COAGULOPATHY: ICD-10-CM

## 2022-07-06 DIAGNOSIS — I49.5 SSS (SICK SINUS SYNDROME): ICD-10-CM

## 2022-07-06 DIAGNOSIS — E03.8 OTHER SPECIFIED HYPOTHYROIDISM: ICD-10-CM

## 2022-07-06 DIAGNOSIS — I48.0 PAF (PAROXYSMAL ATRIAL FIBRILLATION): Primary | ICD-10-CM

## 2022-07-06 DIAGNOSIS — I50.22 CHRONIC SYSTOLIC HEART FAILURE: ICD-10-CM

## 2022-07-06 PROCEDURE — 93282 PRGRMG EVAL IMPLANTABLE DFB: CPT | Performed by: INTERNAL MEDICINE

## 2022-07-06 PROCEDURE — 99213 OFFICE O/P EST LOW 20 MIN: CPT | Performed by: NURSE PRACTITIONER

## 2022-07-06 NOTE — PROGRESS NOTES
Chief Complaint   Patient presents with   • Follow-up     Cardiac management. Has no cardiac complaints today .   • Pacemaker Check     [employer] device interrogation   • Atrial Fibrillation     Denies any symptoms of AFIB   • LABS     PCP will draw labs  August 2022 at next office visit . PCP manages PT/INR coumadin therapy   • Med Refill     No refills needed today. PCP manages refills       Subjective       Kyung Graves is a 89 y.o. male with a history of PAF and non-ischemic cardiomyopathy with EF 35% diagnosed in the past for which he had an ICD placed. He is anticoagulated with warfarin managed by PCP.  Cardiac cath repeated in 2015 showed normal coronaries. Echocardiogram on 3/21/18 showed questionable bicuspid AV, mod AI, AO root 4.9 cm. On 4/1/19, he underwent single chamber AICD generator replacement. On 8/13/2020 underwent Lexiscan stress test and echocardiogram and no obvious ischemia was noted.  LVEF was 60%.    Today returns to the office accompanied by his daughter.  He denies cardiac symptoms or concerns.  He has mild swelling in his lower legs which is not a new or worsening symptom.  He remains on warfarin therapy and bleeding denied.  Since last visit he has not experienced any more falls and uses a cane or walker for safety.    Cardiac History:    Past Surgical History:   Procedure Laterality Date   • CARDIAC CATHETERIZATION  07/21/2005    Cath-() Normal Coronaries. Normal EF   • CARDIAC CATHETERIZATION  02/13/2015    Cath-normal coronaries   • CARDIOVASCULAR STRESS TEST  01/14/2008    Stacey-() EF 34% Lateral Infarct   • CARDIOVASCULAR STRESS TEST  12/20/2013    L.Myoview-mild ischemia, Imdur, cath if symptoms persist   • CARDIOVASCULAR STRESS TEST  08/13/2020    Lexiscan- EF 58%. Diaphramatic attenuation   • ECHO - CONVERTED  06/14/2011    Echo-() EF35%. Inferior WMA.   • ECHO - CONVERTED  02/01/2012    Echo-EF 50-55%, aortic root dilation   • ECHO - CONVERTED   12/03/2013    Echo-EF 50%. RVSP-42mmHg.   • ECHO - CONVERTED  03/21/2018    EF 55%. ?Bicuspia AV. Mod AI. AO Root- 4.9   • ECHO - CONVERTED  08/13/2020    TLS. EF 60%. LA- 3.9 Cm Mild MR & AI, AO- 4.4 Cm. RVSP- 49 mmHg   • PACEMAKER IMPLANTATION  05/04/2010    Implantable defibrillator /ICD-() [employer]   • US CAROTID UNILATERAL  06/14/2011    Carotid US-Normal       Current Outpatient Medications   Medication Sig Dispense Refill   • allopurinol (ZYLOPRIM) 300 MG tablet Take 300 mg by mouth daily.     • carvedilol (COREG) 3.125 MG tablet Take 3.125 mg by mouth 2 (Two) Times a Day With Meals.     • hydroCHLOROthiazide (MICROZIDE) 12.5 MG capsule Take 1 capsule by mouth once daily 30 capsule 0   • levothyroxine (SYNTHROID, LEVOTHROID) 112 MCG tablet Take 112 mcg by mouth Daily.     • warfarin (COUMADIN) 6 MG tablet Take 6 mg by mouth Daily.       No current facility-administered medications for this visit.       Patient has no known allergies.    Past Medical History:   Diagnosis Date   • A-fib (HCC)     Hx   • Aortic valve regurgitation     Hx   • Diabetes mellitus (HCC)    • Ejection fraction < 50%     Low   • History of prostate surgery     Laser surgery on prostate > 15yrs ago   • Hypertension    • ICD (implantable cardioverter-defibrillator) in place     ICD- placement (JUAN- 5/4/10)   • Sleep apnea     CPAP       Social History     Socioeconomic History   • Marital status:    Tobacco Use   • Smoking status: Former Smoker   • Smokeless tobacco: Never Used   Vaping Use   • Vaping Use: Never used   Substance and Sexual Activity   • Alcohol use: No   • Drug use: No       Family History   Problem Relation Age of Onset   • No Known Problems Mother    • No Known Problems Father    • Cancer Other        Review of Systems   Constitutional: Positive for malaise/fatigue (attributes to age). Negative for decreased appetite and diaphoresis.   HENT: Negative for nosebleeds.    Eyes: Negative for blurred vision.  "  Cardiovascular: Negative for chest pain, claudication, cyanosis, dyspnea on exertion, irregular heartbeat, leg swelling, near-syncope, orthopnea, palpitations, paroxysmal nocturnal dyspnea and syncope.   Respiratory: Negative for shortness of breath.    Endocrine: Negative for cold intolerance and heat intolerance.   Hematologic/Lymphatic: Negative for bleeding problem. Bruises/bleeds easily.   Skin: Negative for rash.   Musculoskeletal: Negative for falls and myalgias.   Gastrointestinal: Negative for heartburn, melena and nausea.   Genitourinary: Positive for frequency and nocturia. Negative for dysuria and hematuria.   Neurological: Positive for loss of balance. Negative for dizziness and light-headedness.   Psychiatric/Behavioral: The patient does not have insomnia and is not nervous/anxious.         BP Readings from Last 5 Encounters:   07/06/22 120/70   05/19/21 142/72   08/05/20 136/74   09/04/19 142/88   03/20/19 140/100       Wt Readings from Last 5 Encounters:   07/06/22 110 kg (242 lb)   05/19/21 112 kg (246 lb)   08/14/20 117 kg (257 lb 15 oz)   08/05/20 117 kg (257 lb 6.4 oz)   09/04/19 117 kg (258 lb)       Objective      Labs 9/11/2020: WBC 7.8, RBC 3, platelets 199, calcium 7.5, glucose 181, BUN 30, total protein 5.5, albumin 2.3, total bili 2.6, , sodium 134, potassium 3.9, chloride 101, carbon dioxide 28, creatinine 1.16, GFR 63, AST 26, ALT 13,       /70 (BP Location: Left arm)   Pulse 62   Ht 193 cm (75.98\")   Wt 110 kg (242 lb)   BMI 29.47 kg/m²     Vitals and nursing note reviewed.   Eyes:      Pupils: Pupils are equal, round, and reactive to light.   HENT:      Head: Normocephalic.   Neck:      Vascular: No carotid bruit.   Pulmonary:      Effort: Pulmonary effort is normal.      Breath sounds: Normal breath sounds.   Cardiovascular:      Normal rate. Irregular rhythm.   Edema:     Ankle: bilateral 2+ edema of the ankle.     Feet: bilateral 1+ edema of the feet.  Abdominal: "      General: Bowel sounds are normal.      Palpations: Abdomen is soft.   Musculoskeletal: Normal range of motion.      Cervical back: Normal range of motion. Skin:     General: Skin is warm.   Neurological:      Mental Status: Alert and oriented to person, place, and time.          Procedures: ICD interrogation done today          Assessment & Plan   Diagnoses and all orders for this visit:    1. PAF (paroxysmal atrial fibrillation) (HCC) (Primary)    2. Warfarin-induced coagulopathy (HCC)    3. SSS (sick sinus syndrome) ([facility])    4. S/P ICD (internal cardiac defibrillator) procedure    5. Chronic systolic heart failure (HCC)    6. Primary hypertension    7. Other specified hypothyroidism      PAF  - Heart rate controlled.  Continue carvedilol.  For stroke prevention he is on Coumadin therapy and bleeding denied.  PT/INR managed by PCP.    SSS/ICD  -Interrogation today shows battery life around 6.6 years.  Device mode is VVIR with base rate of 50.  Repeat interrogation scheduled to be done in 6 months with follow-up visit.    Chronic systolic heart failure  - Currently well compensated.  Continue current plan of care.  -Patient plans to have labs repeated in August per PCP.  Please forward copy of results when available.    Hypothyroidism  -Takes Synthroid appropriately.  Plans to have repeat thyroid function test next month.    Currently patient appears stable from a cardiac standpoint.  A 6-month follow-up visit along with device interrogation scheduled.  Please call sooner for cardiac concerns.

## 2022-09-09 DIAGNOSIS — I50.22 CHRONIC SYSTOLIC HEART FAILURE: ICD-10-CM

## 2022-09-09 DIAGNOSIS — I48.0 PAF (PAROXYSMAL ATRIAL FIBRILLATION): ICD-10-CM

## 2022-09-14 RX ORDER — HYDROCHLOROTHIAZIDE 12.5 MG/1
CAPSULE, GELATIN COATED ORAL
Qty: 30 CAPSULE | Refills: 0 | Status: SHIPPED | OUTPATIENT
Start: 2022-09-14 | End: 2022-11-07

## 2022-11-07 DIAGNOSIS — I50.22 CHRONIC SYSTOLIC HEART FAILURE: ICD-10-CM

## 2022-11-07 DIAGNOSIS — I48.0 PAF (PAROXYSMAL ATRIAL FIBRILLATION): ICD-10-CM

## 2022-11-07 RX ORDER — HYDROCHLOROTHIAZIDE 12.5 MG/1
CAPSULE, GELATIN COATED ORAL
Qty: 30 CAPSULE | Refills: 0 | Status: SHIPPED | OUTPATIENT
Start: 2022-11-07 | End: 2022-12-15

## 2022-12-15 DIAGNOSIS — I48.0 PAF (PAROXYSMAL ATRIAL FIBRILLATION): ICD-10-CM

## 2022-12-15 DIAGNOSIS — I50.22 CHRONIC SYSTOLIC HEART FAILURE: ICD-10-CM

## 2022-12-15 RX ORDER — HYDROCHLOROTHIAZIDE 12.5 MG/1
CAPSULE, GELATIN COATED ORAL
Qty: 30 CAPSULE | Refills: 0 | Status: SHIPPED | OUTPATIENT
Start: 2022-12-15 | End: 2023-01-04

## 2023-01-04 ENCOUNTER — OFFICE VISIT (OUTPATIENT)
Dept: CARDIOLOGY | Facility: CLINIC | Age: 88
End: 2023-01-04
Payer: MEDICARE

## 2023-01-04 VITALS
HEIGHT: 76 IN | BODY MASS INDEX: 29.83 KG/M2 | HEART RATE: 60 BPM | WEIGHT: 245 LBS | SYSTOLIC BLOOD PRESSURE: 110 MMHG | DIASTOLIC BLOOD PRESSURE: 80 MMHG

## 2023-01-04 DIAGNOSIS — R60.0 BILATERAL LEG EDEMA: ICD-10-CM

## 2023-01-04 DIAGNOSIS — Z95.810 S/P ICD (INTERNAL CARDIAC DEFIBRILLATOR) PROCEDURE: ICD-10-CM

## 2023-01-04 DIAGNOSIS — E03.8 OTHER SPECIFIED HYPOTHYROIDISM: ICD-10-CM

## 2023-01-04 DIAGNOSIS — I48.0 PAF (PAROXYSMAL ATRIAL FIBRILLATION): ICD-10-CM

## 2023-01-04 DIAGNOSIS — T45.515A WARFARIN-INDUCED COAGULOPATHY: ICD-10-CM

## 2023-01-04 DIAGNOSIS — I49.5 SSS (SICK SINUS SYNDROME): ICD-10-CM

## 2023-01-04 DIAGNOSIS — R05.9 COUGH, UNSPECIFIED TYPE: ICD-10-CM

## 2023-01-04 DIAGNOSIS — D68.32 WARFARIN-INDUCED COAGULOPATHY: ICD-10-CM

## 2023-01-04 DIAGNOSIS — I50.22 CHRONIC SYSTOLIC HEART FAILURE: Primary | ICD-10-CM

## 2023-01-04 DIAGNOSIS — I50.22 CHRONIC SYSTOLIC HEART FAILURE: ICD-10-CM

## 2023-01-04 DIAGNOSIS — I49.5 SSS (SICK SINUS SYNDROME): Primary | ICD-10-CM

## 2023-01-04 PROCEDURE — 93289 INTERROG DEVICE EVAL HEART: CPT | Performed by: INTERNAL MEDICINE

## 2023-01-04 PROCEDURE — 99214 OFFICE O/P EST MOD 30 MIN: CPT | Performed by: NURSE PRACTITIONER

## 2023-01-04 PROCEDURE — 1160F RVW MEDS BY RX/DR IN RCRD: CPT | Performed by: NURSE PRACTITIONER

## 2023-01-04 PROCEDURE — 1159F MED LIST DOCD IN RCRD: CPT | Performed by: NURSE PRACTITIONER

## 2023-01-04 RX ORDER — CARVEDILOL 3.12 MG/1
3.12 TABLET ORAL 2 TIMES DAILY WITH MEALS
Qty: 180 TABLET | Refills: 2 | Status: SHIPPED | OUTPATIENT
Start: 2023-01-04

## 2023-01-04 RX ORDER — ALLOPURINOL 300 MG/1
TABLET ORAL
COMMUNITY
Start: 2013-12-03

## 2023-01-04 RX ORDER — FAMOTIDINE 20 MG/1
20 TABLET, FILM COATED ORAL NIGHTLY PRN
Start: 2023-01-04

## 2023-01-04 RX ORDER — HYDROCHLOROTHIAZIDE 12.5 MG/1
12.5 CAPSULE, GELATIN COATED ORAL DAILY
Qty: 120 CAPSULE | Refills: 2 | Status: SHIPPED | OUTPATIENT
Start: 2023-01-04

## 2023-06-16 DIAGNOSIS — R60.0 BILATERAL LEG EDEMA: Primary | ICD-10-CM

## 2023-06-16 DIAGNOSIS — R06.02 SHORTNESS OF BREATH: ICD-10-CM

## 2023-06-16 DIAGNOSIS — Z79.899 MEDICATION MANAGEMENT: ICD-10-CM

## 2023-06-16 DIAGNOSIS — I50.22 CHRONIC SYSTOLIC HEART FAILURE: ICD-10-CM

## 2023-08-01 ENCOUNTER — TELEPHONE (OUTPATIENT)
Dept: CARDIOLOGY | Facility: CLINIC | Age: 88
End: 2023-08-01

## 2023-08-01 NOTE — TELEPHONE ENCOUNTER
Caller: Shahrzad Barlow    Relationship to patient: Emergency Contact    Best call back number: [phone]    Patient is needing: PATIENT IS HAVING A LOT OF SWELLING IN HIS FEET AND LEGS, A LOT OF CONGESTION, AND HAS GOTTEN TO WHERE HE CAN'T WALK. PATIENT'S DAUGHTER STATES SHE WILL BE TAKING HIM TO THE HOSPITAL AND WOULD LIKE TO RESCHEDULE HIS APPOINTMENT WITH [facility] FOR TOMORROW. PATIENT HAS A FOLLOW UP AND DEVICE CHECK.

## 2023-08-01 NOTE — TELEPHONE ENCOUNTER
Patient daughter requested a same day appt with pacemaker check, next opening would be in January but the pacemaker and appt would be around 3 hour time difference. Since patient is in wheelchair they cannot move as freely to be able to get lunch and come back. Patient made aware that we do have cancellations and to call back and see if any openings that would work better appear.

## 2023-08-25 NOTE — TELEPHONE ENCOUNTER
Patient remains a patient at [facility].  Patient has Echo and CT scan done . Medical records to fax us reports.

## 2024-01-31 ENCOUNTER — TELEPHONE (OUTPATIENT)
Dept: CARDIOLOGY | Facility: CLINIC | Age: 89
End: 2024-01-31

## 2024-01-31 NOTE — TELEPHONE ENCOUNTER
Caller: Shahrzad Barlow    Relationship to patient: Emergency Contact    Best call back number: [phone]    Chief complaint: RESCHEDULE FEBRUARY 7    Type of visit: BOTH AT THE SAME TIME    Requested date: TO BE DONE IN MAY

## 2024-02-01 NOTE — TELEPHONE ENCOUNTER
I spoke with patient's daughter Shahrzad and she is afraid to get  him out during winter months due to risk of sickness . He is scheduled for the next available for follow up with Annita and [employer] device on July 3,2024 at 11:45 am for Device check and 12:15 pm with Annita BATES for follow up .

## 2024-05-09 ENCOUNTER — TELEPHONE (OUTPATIENT)
Dept: CARDIOLOGY | Facility: CLINIC | Age: 89
End: 2024-05-09

## 2024-05-09 NOTE — TELEPHONE ENCOUNTER
Caller: Shahrzad Barlow    Relationship: Emergency Contact    Best call back number: [phone]     What is the best time to reach you: AFTERNOONS     What was the call regarding: CALLER STATES PT HAD HOSPICE CALLED IN ON 5/8/24, AND IS NO LONGER NEEDING APPT FOR 5/21 OR APPTS IN JULY. JUST MAKING OFFICE AWARE.     Is it okay if the provider responds through MyChart: CALL